# Patient Record
Sex: FEMALE | Race: WHITE | NOT HISPANIC OR LATINO | Employment: FULL TIME | URBAN - METROPOLITAN AREA
[De-identification: names, ages, dates, MRNs, and addresses within clinical notes are randomized per-mention and may not be internally consistent; named-entity substitution may affect disease eponyms.]

---

## 2020-02-24 ENCOUNTER — OFFICE VISIT (OUTPATIENT)
Dept: URGENT CARE | Facility: CLINIC | Age: 30
End: 2020-02-24
Payer: COMMERCIAL

## 2020-02-24 VITALS
SYSTOLIC BLOOD PRESSURE: 133 MMHG | HEIGHT: 65 IN | WEIGHT: 175 LBS | RESPIRATION RATE: 18 BRPM | HEART RATE: 109 BPM | TEMPERATURE: 99.7 F | DIASTOLIC BLOOD PRESSURE: 85 MMHG | OXYGEN SATURATION: 100 % | BODY MASS INDEX: 29.16 KG/M2

## 2020-02-24 DIAGNOSIS — R09.82 PND (POST-NASAL DRIP): ICD-10-CM

## 2020-02-24 DIAGNOSIS — J02.0 STREP PHARYNGITIS: Primary | ICD-10-CM

## 2020-02-24 DIAGNOSIS — J02.9 SORE THROAT: ICD-10-CM

## 2020-02-24 LAB — S PYO AG THROAT QL: POSITIVE

## 2020-02-24 PROCEDURE — 99213 OFFICE O/P EST LOW 20 MIN: CPT | Performed by: NURSE PRACTITIONER

## 2020-02-24 PROCEDURE — 87880 STREP A ASSAY W/OPTIC: CPT | Performed by: NURSE PRACTITIONER

## 2020-02-24 RX ORDER — FLUTICASONE PROPIONATE 50 MCG
1 SPRAY, SUSPENSION (ML) NASAL 2 TIMES DAILY
Qty: 1 BOTTLE | Refills: 0 | Status: SHIPPED | OUTPATIENT
Start: 2020-02-24 | End: 2022-05-16 | Stop reason: ALTCHOICE

## 2020-02-24 RX ORDER — AMOXICILLIN 875 MG/1
875 TABLET, COATED ORAL 2 TIMES DAILY
Qty: 14 TABLET | Refills: 0 | Status: SHIPPED | OUTPATIENT
Start: 2020-02-24 | End: 2020-03-02

## 2020-02-24 RX ORDER — MONTELUKAST SODIUM 10 MG/1
10 TABLET ORAL
COMMUNITY

## 2020-02-24 NOTE — PATIENT INSTRUCTIONS
-Take antibiotics as prescribed  -Take Tylenol/Ibuprofen as needed  -Stay hydrated, drink lots of fluids, soups, and tea with honey   -Change tooth brush 24 hours after beginning antibiotics    -Warm salt water gargles may help with sore throat  -Use cool or warm humidifier     -Your symptoms may last up to 14 days, continue supportive therapy as needed   -Follow up with your PCP if your symptoms become worse or do not improve  -If you have difficulty breathing, can not catch your breath or feel short of breath go directly to the emergency room

## 2020-02-24 NOTE — PROGRESS NOTES
[facility]        NAME: Dennis Aviles is a 34 y o  female  : 1990    MRN: [id number]  DATE: 2020  TIME: 6:41 PM    Assessment and Plan   Strep pharyngitis [J02 0]  1  Strep pharyngitis  amoxicillin (AMOXIL) 875 mg tablet   2  PND (post-nasal drip)  fluticasone (FLONASE) 50 mcg/act nasal spray   3  Sore throat  POCT rapid strepA         Patient Instructions     -Take antibiotics as prescribed  -Take Tylenol/Ibuprofen as needed  -Stay hydrated, drink lots of fluids, soups, and tea with honey   -Change tooth brush 24 hours after beginning antibiotics    -Warm salt water gargles may help with sore throat  -Use cool or warm humidifier     -Your symptoms may last up to 14 days, continue supportive therapy as needed   -Follow up with your PCP if your symptoms become worse or do not improve  -If you have difficulty breathing, can not catch your breath or feel short of breath go directly to the emergency room  Follow up with PCP in 3-5 days  Proceed to  ER if symptoms worsen  Chief Complaint     Chief Complaint   Patient presents with    Sore Throat     sore throat and ear pain since saturday  took advil yesterday, nothing today          History of Present Illness       33 y/o female presents with sore throat and ear pressure  She states the ear pressure has been present for weeks  She has a history of allergies in which she takes singular and Xyzal   Her sore throat began 2 days ago  She denies any N/V, HA, congestion, cough, or difficulty breathing  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  She has not taken any OTC medications or done any supportive measures such as warm salt water gargles  Review of Systems   Review of Systems   Constitutional: Negative for chills, fatigue and fever  HENT: Positive for sore throat  Negative for congestion, ear pain, rhinorrhea, sinus pressure and trouble swallowing      Respiratory: Negative for cough, shortness of breath and wheezing  Cardiovascular: Negative for chest pain and palpitations  Gastrointestinal: Negative for abdominal pain, diarrhea, nausea and vomiting  Skin: Negative for rash  Neurological: Negative for headaches  Current Medications       Current Outpatient Medications:     Levocetirizine Dihydrochloride (XYZAL PO), Take by mouth, Disp: , Rfl:     montelukast (SINGULAIR) 10 mg tablet, Take 10 mg by mouth daily at bedtime, Disp: , Rfl:     amoxicillin (AMOXIL) 875 mg tablet, Take 1 tablet (875 mg total) by mouth 2 (two) times a day for 7 days, Disp: 14 tablet, Rfl: 0    fluticasone (FLONASE) 50 mcg/act nasal spray, 1 spray into each nostril 2 (two) times a day for 5 days, Disp: 1 Bottle, Rfl: 0    Current Allergies     Allergies as of 02/24/2020    (No Known Allergies)            The following portions of the patient's history were reviewed and updated as appropriate: allergies, current medications, past family history, past medical history, past social history, past surgical history and problem list      Past Medical History:   Diagnosis Date    Allergic        History reviewed  No pertinent surgical history  History reviewed  No pertinent family history  Medications have been verified  Objective   /85   Pulse (!) 109   Temp 99 7 °F (37 6 °C)   Resp 18   Ht 5' 5" (1 651 m)   Wt 79 4 kg (175 lb)   LMP 02/11/2020   SpO2 100%   BMI 29 12 kg/m²        Physical Exam     Physical Exam   Constitutional: She is oriented to person, place, and time  She appears well-developed and well-nourished  HENT:   Right Ear: External ear and ear canal normal  A middle ear effusion is present  Left Ear: Tympanic membrane, external ear and ear canal normal    Nose: Rhinorrhea present  No mucosal edema  Mouth/Throat: Posterior oropharyngeal erythema present  No posterior oropharyngeal edema  Tonsils are 1+ on the right  Tonsils are 1+ on the left  No tonsillar exudate  Posterior pharynx is red and inflamed, cobblestone in appearance  Cardiovascular: Regular rhythm and normal heart sounds  Tachycardia present  Pulmonary/Chest: Effort normal and breath sounds normal  No respiratory distress  She has no rales  Musculoskeletal: Normal range of motion  Lymphadenopathy:        Head (right side): No tonsillar adenopathy present  Head (left side): No tonsillar adenopathy present  Neurological: She is alert and oriented to person, place, and time  She has normal strength  GCS eye subscore is 4  GCS verbal subscore is 5  GCS motor subscore is 6  Skin: Skin is warm and dry  No pallor  Psychiatric: She has a normal mood and affect  Her speech is normal and behavior is normal    Nursing note and vitals reviewed

## 2020-02-24 NOTE — LETTER
February 24, 2020     Patient: Keara Estrada   YOB: 1990   Date of Visit: 2/24/2020       To Whom It May Concern: It is my medical opinion that Keara Estrada may return to work on 2/26/2020  If you have any questions or concerns, please don't hesitate to call           Sincerely,        KOFI Garcia

## 2021-04-09 DIAGNOSIS — Z23 ENCOUNTER FOR IMMUNIZATION: ICD-10-CM

## 2022-05-16 ENCOUNTER — OFFICE VISIT (OUTPATIENT)
Dept: URGENT CARE | Facility: CLINIC | Age: 32
End: 2022-05-16
Payer: COMMERCIAL

## 2022-05-16 VITALS
HEIGHT: 65 IN | WEIGHT: 205 LBS | DIASTOLIC BLOOD PRESSURE: 70 MMHG | TEMPERATURE: 100.3 F | SYSTOLIC BLOOD PRESSURE: 110 MMHG | BODY MASS INDEX: 34.16 KG/M2 | RESPIRATION RATE: 20 BRPM | OXYGEN SATURATION: 97 % | HEART RATE: 146 BPM

## 2022-05-16 DIAGNOSIS — J06.9 VIRAL UPPER RESPIRATORY TRACT INFECTION: Primary | ICD-10-CM

## 2022-05-16 PROCEDURE — 99213 OFFICE O/P EST LOW 20 MIN: CPT | Performed by: PHYSICIAN ASSISTANT

## 2022-05-16 RX ORDER — BENZONATATE 200 MG/1
200 CAPSULE ORAL 3 TIMES DAILY PRN
Qty: 20 CAPSULE | Refills: 0 | Status: SHIPPED | OUTPATIENT
Start: 2022-05-16

## 2022-05-16 NOTE — LETTER
May 16, 2022     Patient: Shira Ford   YOB: 1990   Date of Visit: 5/16/2022       To Whom it May Concern:    Shira Ford was seen in my clinic on 5/16/2022  Please excuse from work on 5/16/22 and 5/17/22  If you have any questions or concerns, please don't hesitate to call           Sincerely,          [facility] Provider        CC: No Recipients

## 2022-05-16 NOTE — PROGRESS NOTES
[facility]        NAME: Gracie Butts is a 32 y o  female  : 1990    MRN: [id number]  DATE: May 16, 2022  TIME: 12:30 PM    Assessment and Plan   Viral upper respiratory tract infection [J06 9]  1  Viral upper respiratory tract infection  benzonatate (TESSALON) 200 MG capsule     Patient Instructions   Viral upper respiratory infection  Recommend mucinex D and flonase for postnasal drip and cough  Rest, fluids and supportive care  May benefit from a cool mist humidifier on night stand  Tylenol/ibuprofen as needed for pain/fever    Follow up with PCP in 3-5 days  Proceed to  ER if symptoms worsen  Chief Complaint     Chief Complaint   Patient presents with    Cold Like Symptoms     Pt here ill x 2 weeks  cough  then last night fever  102,  head congestion  Pt used Advil   Cold,   home covid   last weekend  all negative this was 1 week ago  History of Present Illness       Esther Sanders is a 70-year-old female who presents to clinic complaining of persistent cough x2 weeks  She states that it was improving however last night she developed fever, headache, nasal congestion, and chest tightness with deep inhalation  She also notes bilateral ear pressure but denies any ear pain  She states that her fever was 100 3° F  she denies any chills, sore throat, fatigue, myalgias, ear pain, sinus pain or pressure, shortness of breath, nausea, vomiting, diarrhea, loss of taste or smell, recent travel, or exposure to anyone known COVID positive  Review of Systems   Review of Systems   Constitutional: Positive for fever  Negative for chills and fatigue  HENT: Positive for congestion  Negative for ear pain, sinus pressure, sinus pain and sore throat  Respiratory: Positive for cough and chest tightness  Negative for shortness of breath  Gastrointestinal: Negative for diarrhea, nausea and vomiting  Musculoskeletal: Negative for myalgias  Neurological: Positive for headaches   Negative for dizziness and light-headedness  Current Medications       Current Outpatient Medications:     benzonatate (TESSALON) 200 MG capsule, Take 1 capsule (200 mg total) by mouth as needed in the morning and 1 capsule (200 mg total) as needed at noon and 1 capsule (200 mg total) as needed in the evening for cough  , Disp: 20 capsule, Rfl: 0    Levocetirizine Dihydrochloride (XYZAL PO), Take by mouth, Disp: , Rfl:     montelukast (SINGULAIR) 10 mg tablet, Take 10 mg by mouth daily at bedtime, Disp: , Rfl:     Norethin-Eth Estrad-Fe Biphas (LO LOESTRIN FE PO), Take by mouth, Disp: , Rfl:     Current Allergies     Allergies as of 05/16/2022    (No Known Allergies)            The following portions of the patient's history were reviewed and updated as appropriate: allergies, current medications, past family history, past medical history, past social history, past surgical history and problem list      Past Medical History:   Diagnosis Date    Allergic        History reviewed  No pertinent surgical history  Family History   Problem Relation Age of Onset    No Known Problems Mother     No Known Problems Father          Medications have been verified  Objective   /70 (BP Location: Right arm, Patient Position: Sitting, Cuff Size: Standard)   Pulse (!) 146   Temp 100 3 °F (37 9 °C) (Tympanic)   Resp 20   Ht 5' 5" (1 651 m)   Wt 93 kg (205 lb)   SpO2 97%   BMI 34 11 kg/m²   No LMP recorded  Physical Exam     Physical Exam  Vitals and nursing note reviewed  Constitutional:       General: She is not in acute distress  Appearance: Normal appearance  She is not ill-appearing  HENT:      Right Ear: Tympanic membrane, ear canal and external ear normal       Left Ear: Tympanic membrane, ear canal and external ear normal       Nose: Congestion present  Mouth/Throat:      Mouth: Mucous membranes are moist       Pharynx: No oropharyngeal exudate or posterior oropharyngeal erythema  Cardiovascular:      Rate and Rhythm: Normal rate and regular rhythm  Heart sounds: Normal heart sounds  Pulmonary:      Effort: Pulmonary effort is normal       Breath sounds: Normal breath sounds  Lymphadenopathy:      Cervical: No cervical adenopathy  Neurological:      Mental Status: She is alert and oriented to person, place, and time     Psychiatric:         Mood and Affect: Mood normal          Behavior: Behavior normal

## 2022-05-16 NOTE — PATIENT INSTRUCTIONS
Viral upper respiratory infection  Recommend mucinex D and flonase for postnasal drip and cough  Rest, fluids and supportive care  May benefit from a cool mist humidifier on night stand  Tylenol/ibuprofen as needed for pain/fever    Follow up with PCP in 3-5 days  Proceed to  ER if symptoms worsen

## 2022-08-20 NOTE — PROGRESS NOTES
Assessment and Plan:   Ms Chong William is a 35-year-old female with no significant past medical history who presents for an evaluation of joint pains  She is referred by Dr Neva Thompson for a rheumatology consult  Caren Kern presents today for an evaluation of joint pains and stiffness which has primarily affected her hands and ankles since March 2022 along with visual disturbances since January 2022 with both of these complaints arising after her uneventful pregnancy in delivery in December 2021  With the joint pain, stiffness as well as episode of thumb swelling she describes her symptoms subjectively appears concerning for an inflammatory arthritis but I do not appreciate any evidence of synovitis on her examination today  The serologies so far including an DAYNA by immunofluorescence and rheumatoid factor were normal and it is also reassuring to note that while she did have elevated inflammatory markers in June 2022 on the most recent blood work done on 08/19/2022 her C-reactive protein has near normalized  To further evaluate the joint complaints I would like to complete the inflammatory arthritis workup as listed below  Depending on the results I will determine if there may be a specific diagnosis at this time and change in management, but for now her symptoms will be treated conservatively with use of over-the-counter NSAIDs as needed  - I requested she discuss with her primary care physician if the visual disturbances may be an aura for migraine headaches or if she may be presenting with ocular migraines, but I suspect less likely that this has a rheumatological association     - In regards to the thrombocytopenia it is concerning that she has had a significant drop in her platelet count but the etiology for this is unclear and it has not been a chronic issue    I would like to update a CBC today and if the platelet count is still low then she will need an urgent appointment with her primary care physician and Hematology  Plan:  Diagnoses and all orders for this visit:    Diffuse arthralgia  -     Sedimentation rate, automated; Future  -     Sjogren's Antibodies; Future  -     Cyclic citrul peptide antibody, IgG; Future  -     Ferritin; Future  -     HLA-B27 antigen; Future  -     CBC and differential; Future  -     XR hand 3+ vw right; Future  -     XR hand 3+ vw left; Future  -     Anti-neutrophilic cytoplasmic antibody; Future  -     Angiotensin converting enzyme; Future    Thrombocytopenia (HCC)  -     Sedimentation rate, automated; Future  -     Sjogren's Antibodies; Future  -     Cyclic citrul peptide antibody, IgG; Future  -     Ferritin; Future  -     HLA-B27 antigen; Future  -     CBC and differential; Future  -     Anti-neutrophilic cytoplasmic antibody; Future  -     Angiotensin converting enzyme; Future    Need for hepatitis C screening test  -     Chronic Hepatitis Panel; Future    Thyroid disorder screening  -     TSH, 3rd generation; Future    Other orders  -     amoxicillin (AMOXIL) 875 mg tablet; Take 875 mg by mouth every 12 (twelve) hours  -     doxycycline hyclate (VIBRA-TABS) 100 mg tablet; take 1 tablet by mouth twice a day for 14 days      Activities as tolerated  Continue other medications as prescribed by PCP and other specialists  RTC in 3 months  HPI  Ms Alexis Campuzano is a 80-year-old female with no significant past medical history who presents for an evaluation of joint pains  She is referred by Dr Artem Burden for a rheumatology consult  Patient reports she was in her usual state of health up until January 2022 when after the birth of her child in December 2021 she started to experience new symptoms  Firstly she started with vision issues where she had difficulty focusing on faces and letters especially if there was background light  She had an eye exam done which was normal and was advised she may just have photophobia    She reports these symptoms are still ongoing and usually after this occurs she will start to experience a headache  Additionally, she has experienced chronic joint pains affecting her ankles which she was informed may be secondary to flat feet, but in March 2022 she started to experience new joint pains affecting her hands (worse on the left side) mostly throughout her thumbs and at the bases of her thumbs as well as worsening in her bilateral ankles at the lateral malleolus and the outer aspect of her feet  She has experienced chronic joint pains in her left shoulder region for which she follows with a chiropractor  She reports during her pregnancy she had low back pain but currently is not experiencing concerning back symptoms  She does notice a clicking noise and crunchy sensation with certain positions  No nighttime back pain  No significant joint pains in her wrists, elbows, worsening in her shoulders, hips or knees  She mentions in June she had spontaneous onset of swelling affecting her bilateral thumbs starting at the interphalangeal joints and radiating down  This lasted for about a week and then resolved  No other swelling of the joints  She experiences morning stiffness affecting her hands and ankles that takes about 1 to 1-1/2 hours to improve  She takes an occasional Advil as needed which does help her  She reports a few months ago her symptoms spontaneously improved for about a month but then recurred a few weeks ago  She has also experienced episodes of numbness affecting her bilateral arms from the shoulders down to her fingers which is not necessarily position dependent  She was seen by her primary care physician for these complaints and as there were concerns for Lyme disease she was treated with a 3 week course of doxycycline which she completed in mid July  This did not impact her symptoms      She also had testing done through her primary care physician initially in June 2022 which showed an elevated ESR of 42 and C reactive protein of 26  A CBC, CMP, iron, tick antibody profile, rheumatoid factor, vitamin-D, vitamin B12 and DYANA by immunofluorescence were unremarkable  She had follow-up testing done on 08/19/2022 and this showed a significantly low platelet count of 65  A C-reactive protein had improved to 12  A CMP was unremarkable  Her primary care physician has not reached out to her with these results yet  She denies fevers, unintentional weight loss, alopecia, dry mouth (does have dry eyes), inflammatory eye disease, skin rash, psoriasis, photosensitivity, mouth/nose ulcers, swollen glands, pleuritic chest pain, inflammatory bowel disease, blood clots, miscarriages or Raynaud's  She reports a history of osteoarthritis in her maternal grandmother  No recent infections  The following portions of the patient's history were reviewed and updated as appropriate: allergies, current medications, past family history, past medical history, past social history, past surgical history and problem list       Review of Systems  Constitutional: Negative for weight change, fevers, chills, night sweats, fatigue  ENT/Mouth: Negative for hearing changes, ear pain, nasal congestion, sinus pain, hoarseness, sore throat, rhinorrhea, swallowing difficulty  Eyes: Negative for pain, redness, discharge, vision changes  Cardiovascular: Negative for chest pain, SOB, palpitations  Respiratory: Negative for cough, sputum, wheezing, dyspnea  Gastrointestinal: Negative for nausea, vomiting, diarrhea, constipation, pain, heartburn  Genitourinary: Negative for dysuria, urinary frequency, hematuria  Musculoskeletal: As per HPI  Skin: Negative for skin rash, color changes  Neuro: Negative for weakness, loss of consciousness  Positive for numbness and tingling  Psych: Negative for anxiety, depression  Heme/Lymph: Negative for easy bruising, bleeding, lymphadenopathy          Past Medical History:   Diagnosis Date    Allergic History reviewed  No pertinent surgical history  Social History     Socioeconomic History    Marital status: /Civil Union     Spouse name: Not on file    Number of children: Not on file    Years of education: Not on file    Highest education level: Not on file   Occupational History    Not on file   Tobacco Use    Smoking status: Never Smoker    Smokeless tobacco: Never Used   Vaping Use    Vaping Use: Never used   Substance and Sexual Activity    Alcohol use: Never    Drug use: Never    Sexual activity: Not on file   Other Topics Concern    Not on file   Social History Narrative    Not on file     Social Determinants of Health     Financial Resource Strain: Not on file   Food Insecurity: Not on file   Transportation Needs: Not on file   Physical Activity: Not on file   Stress: Not on file   Social Connections: Not on file   Intimate Partner Violence: Not on file   Housing Stability: Not on file       Family History   Problem Relation Age of Onset    No Known Problems Mother     Atrial fibrillation Father     Stroke Maternal Grandmother     Osteoporosis Maternal Grandmother     Diabetes Maternal Grandmother     Stroke Maternal Grandfather     Parkinsonism Maternal Grandfather        No Known Allergies      Current Outpatient Medications:     amoxicillin (AMOXIL) 875 mg tablet, Take 875 mg by mouth every 12 (twelve) hours, Disp: , Rfl:     benzonatate (TESSALON) 200 MG capsule, Take 1 capsule (200 mg total) by mouth as needed in the morning and 1 capsule (200 mg total) as needed at noon and 1 capsule (200 mg total) as needed in the evening for cough  , Disp: 20 capsule, Rfl: 0    doxycycline hyclate (VIBRA-TABS) 100 mg tablet, take 1 tablet by mouth twice a day for 14 days, Disp: , Rfl:     Levocetirizine Dihydrochloride (XYZAL PO), Take by mouth, Disp: , Rfl:     montelukast (SINGULAIR) 10 mg tablet, Take 10 mg by mouth daily at bedtime, Disp: , Rfl:     Norethin-Eth Estrad-Fe Biphas (LO LOESTRIN FE PO), Take by mouth, Disp: , Rfl:       Objective:    Vitals:    08/22/22 1103   BP: 114/77   Pulse: 70   Temp: 98 4 °F (36 9 °C)   Weight: 92 3 kg (203 lb 6 4 oz)       Physical Exam  General: Well appearing, well nourished, in no distress  Oriented x 3, normal mood and affect  Ambulating without difficulty  Skin: Good turgor, no rash, unusual bruising or prominent lesions  Hair: Normal texture and distribution  Nails: Normal color, no deformities  HEENT:  Head: Normocephalic, atraumatic  Eyes: Conjunctiva clear, sclera non-icteric, EOM intact  Extremities: No amputations or deformities, cyanosis, edema  Musculoskeletal:   There is no peripheral joint soft tissue swelling or tenderness noted  No enthesitis or dactylitis  Neurologic: Alert and oriented  No focal neurological deficits appreciated  Psychiatric: Normal mood and affect  GE Rod    Rheumatology

## 2022-08-22 ENCOUNTER — OFFICE VISIT (OUTPATIENT)
Dept: RHEUMATOLOGY | Facility: CLINIC | Age: 32
End: 2022-08-22
Payer: COMMERCIAL

## 2022-08-22 ENCOUNTER — TELEPHONE (OUTPATIENT)
Dept: RHEUMATOLOGY | Facility: CLINIC | Age: 32
End: 2022-08-22

## 2022-08-22 VITALS
SYSTOLIC BLOOD PRESSURE: 114 MMHG | HEART RATE: 70 BPM | DIASTOLIC BLOOD PRESSURE: 77 MMHG | TEMPERATURE: 98.4 F | WEIGHT: 203.4 LBS | BODY MASS INDEX: 33.85 KG/M2

## 2022-08-22 DIAGNOSIS — Z13.29 THYROID DISORDER SCREENING: ICD-10-CM

## 2022-08-22 DIAGNOSIS — M25.50 DIFFUSE ARTHRALGIA: Primary | ICD-10-CM

## 2022-08-22 DIAGNOSIS — Z11.59 NEED FOR HEPATITIS C SCREENING TEST: ICD-10-CM

## 2022-08-22 DIAGNOSIS — D69.6 THROMBOCYTOPENIA (HCC): ICD-10-CM

## 2022-08-22 PROCEDURE — 99244 OFF/OP CNSLTJ NEW/EST MOD 40: CPT | Performed by: INTERNAL MEDICINE

## 2022-08-22 RX ORDER — DOXYCYCLINE HYCLATE 100 MG
TABLET ORAL
COMMUNITY
Start: 2022-06-29

## 2022-08-22 RX ORDER — AMOXICILLIN 875 MG/1
875 TABLET, COATED ORAL EVERY 12 HOURS
COMMUNITY
Start: 2022-05-31

## 2022-08-22 NOTE — LETTER
August 22, 2022     [address] [place] [id number]    Patient: Radha Montelongo   YOB: 1990   Date of Visit: 8/22/2022       Dear Dr Fabi Clemens:    Thank you for referring Radha Daughters to me for evaluation  Below are my notes for this consultation  If you have questions, please do not hesitate to call me  I look forward to following your patient along with you  Sincerely,        Matthew Saavedra MD        CC: No Recipients  Matthew Saavedra MD  8/22/2022 11:59 AM  Sign when Signing Visit  Assessment and Plan:   Ms Tano Welsh is a 26-year-old female with no significant past medical history who presents for an evaluation of joint pains  She is referred by Dr Fabi Clemens for a rheumatology consult  Cristopher Chase presents today for an evaluation of joint pains and stiffness which has primarily affected her hands and ankles since March 2022 along with visual disturbances since January 2022 with both of these complaints arising after her uneventful pregnancy in delivery in December 2021  With the joint pain, stiffness as well as episode of thumb swelling she describes her symptoms subjectively appears concerning for an inflammatory arthritis but I do not appreciate any evidence of synovitis on her examination today  The serologies so far including an DAYNA by immunofluorescence and rheumatoid factor were normal and it is also reassuring to note that while she did have elevated inflammatory markers in June 2022 on the most recent blood work done on 08/19/2022 her C-reactive protein has near normalized  To further evaluate the joint complaints I would like to complete the inflammatory arthritis workup as listed below  Depending on the results I will determine if there may be a specific diagnosis at this time and change in management, but for now her symptoms will be treated conservatively with use of over-the-counter NSAIDs as needed      - In regards to the thrombocytopenia it is concerning that she has had a significant drop in her platelet count but the etiology for this is unclear and it has not been a chronic issue  I would like to update a CBC today and if the platelet count is still low then she will need an urgent appointment with her primary care physician and Hematology  Plan:  Diagnoses and all orders for this visit:    Diffuse arthralgia  -     Sedimentation rate, automated; Future  -     Sjogren's Antibodies; Future  -     Cyclic citrul peptide antibody, IgG; Future  -     Ferritin; Future  -     HLA-B27 antigen; Future  -     CBC and differential; Future  -     XR hand 3+ vw right; Future  -     XR hand 3+ vw left; Future  -     Anti-neutrophilic cytoplasmic antibody; Future  -     Angiotensin converting enzyme; Future    Thrombocytopenia (HCC)  -     Sedimentation rate, automated; Future  -     Sjogren's Antibodies; Future  -     Cyclic citrul peptide antibody, IgG; Future  -     Ferritin; Future  -     HLA-B27 antigen; Future  -     CBC and differential; Future  -     Anti-neutrophilic cytoplasmic antibody; Future  -     Angiotensin converting enzyme; Future    Need for hepatitis C screening test  -     Chronic Hepatitis Panel; Future    Thyroid disorder screening  -     TSH, 3rd generation; Future    Other orders  -     amoxicillin (AMOXIL) 875 mg tablet; Take 875 mg by mouth every 12 (twelve) hours  -     doxycycline hyclate (VIBRA-TABS) 100 mg tablet; take 1 tablet by mouth twice a day for 14 days      Activities as tolerated  Continue other medications as prescribed by PCP and other specialists  RTC in 3 months  HPI  Ms Kristine Freedman is a 77-year-old female with no significant past medical history who presents for an evaluation of joint pains  She is referred by Dr Boubacar Thakur for a rheumatology consult      Patient reports she was in her usual state of health up until January 2022 when after the birth of her child in December 2021 she started to experience new symptoms  Firstly she started with vision issues where she had difficulty focusing on faces and letters especially if there was background light  She had an eye exam done which was normal and was advised she may just have photophobia  She reports these symptoms are still ongoing and usually after this occurs she will start to experience a headache  Additionally, she has experienced chronic joint pains affecting her ankles which she was informed may be secondary to flat feet, but in March 2022 she started to experience new joint pains affecting her hands (worse on the left side) mostly throughout her thumbs and at the bases of her thumbs as well as worsening in her bilateral ankles at the lateral malleolus and the outer aspect of her feet  She has experienced chronic joint pains in her left shoulder region for which she follows with a chiropractor  She reports during her pregnancy she had low back pain but currently is not experiencing concerning back symptoms  She does notice a clicking noise and crunchy sensation with certain positions  No nighttime back pain  No significant joint pains in her wrists, elbows, worsening in her shoulders, hips or knees  She mentions in June she had spontaneous onset of swelling affecting her bilateral thumbs starting at the interphalangeal joints and radiating down  This lasted for about a week and then resolved  No other swelling of the joints  She experiences morning stiffness affecting her hands and ankles that takes about 1 to 1-1/2 hours to improve  She takes an occasional Advil as needed which does help her  She reports a few months ago her symptoms spontaneously improved for about a month but then recurred a few weeks ago  She has also experienced episodes of numbness affecting her bilateral arms from the shoulders down to her fingers which is not necessarily position dependent    She was seen by her primary care physician for these complaints and as there were concerns for Lyme disease she was treated with a 3 week course of doxycycline which she completed in mid July  This did not impact her symptoms  She also had testing done through her primary care physician initially in June 2022 which showed an elevated ESR of 42 and C reactive protein of 26  A CBC, CMP, iron, tick antibody profile, rheumatoid factor, vitamin-D, vitamin B12 and DAYNA by immunofluorescence were unremarkable  She had follow-up testing done on 08/19/2022 and this showed a significantly low platelet count of 65  A C-reactive protein had improved to 12  A CMP was unremarkable  Her primary care physician has not reached out to her with these results yet  She denies fevers, unintentional weight loss, alopecia, dry mouth (does have dry eyes), inflammatory eye disease, skin rash, psoriasis, photosensitivity, mouth/nose ulcers, swollen glands, pleuritic chest pain, inflammatory bowel disease, blood clots, miscarriages or Raynaud's  She reports a history of osteoarthritis in her maternal grandmother  No recent infections  The following portions of the patient's history were reviewed and updated as appropriate: allergies, current medications, past family history, past medical history, past social history, past surgical history and problem list       Review of Systems  Constitutional: Negative for weight change, fevers, chills, night sweats, fatigue  ENT/Mouth: Negative for hearing changes, ear pain, nasal congestion, sinus pain, hoarseness, sore throat, rhinorrhea, swallowing difficulty  Eyes: Negative for pain, redness, discharge, vision changes  Cardiovascular: Negative for chest pain, SOB, palpitations  Respiratory: Negative for cough, sputum, wheezing, dyspnea  Gastrointestinal: Negative for nausea, vomiting, diarrhea, constipation, pain, heartburn  Genitourinary: Negative for dysuria, urinary frequency, hematuria  Musculoskeletal: As per HPI    Skin: Negative for skin rash, color changes  Neuro: Negative for weakness, loss of consciousness  Positive for numbness and tingling  Psych: Negative for anxiety, depression  Heme/Lymph: Negative for easy bruising, bleeding, lymphadenopathy  Past Medical History:   Diagnosis Date    Allergic        History reviewed  No pertinent surgical history  Social History     Socioeconomic History    Marital status: /Civil Union     Spouse name: Not on file    Number of children: Not on file    Years of education: Not on file    Highest education level: Not on file   Occupational History    Not on file   Tobacco Use    Smoking status: Never Smoker    Smokeless tobacco: Never Used   Vaping Use    Vaping Use: Never used   Substance and Sexual Activity    Alcohol use: Never    Drug use: Never    Sexual activity: Not on file   Other Topics Concern    Not on file   Social History Narrative    Not on file     Social Determinants of Health     Financial Resource Strain: Not on file   Food Insecurity: Not on file   Transportation Needs: Not on file   Physical Activity: Not on file   Stress: Not on file   Social Connections: Not on file   Intimate Partner Violence: Not on file   Housing Stability: Not on file       Family History   Problem Relation Age of Onset    No Known Problems Mother     Atrial fibrillation Father     Stroke Maternal Grandmother     Osteoporosis Maternal Grandmother     Diabetes Maternal Grandmother     Stroke Maternal Grandfather     Parkinsonism Maternal Grandfather        No Known Allergies      Current Outpatient Medications:     amoxicillin (AMOXIL) 875 mg tablet, Take 875 mg by mouth every 12 (twelve) hours, Disp: , Rfl:     benzonatate (TESSALON) 200 MG capsule, Take 1 capsule (200 mg total) by mouth as needed in the morning and 1 capsule (200 mg total) as needed at noon and 1 capsule (200 mg total) as needed in the evening for cough  , Disp: 20 capsule, Rfl: 0    doxycycline hyclate (VIBRA-TABS) 100 mg tablet, take 1 tablet by mouth twice a day for 14 days, Disp: , Rfl:     Levocetirizine Dihydrochloride (XYZAL PO), Take by mouth, Disp: , Rfl:     montelukast (SINGULAIR) 10 mg tablet, Take 10 mg by mouth daily at bedtime, Disp: , Rfl:     Norethin-Eth Estrad-Fe Biphas (LO LOESTRIN FE PO), Take by mouth, Disp: , Rfl:       Objective:    Vitals:    08/22/22 1103   BP: 114/77   Pulse: 70   Temp: 98 4 °F (36 9 °C)   Weight: 92 3 kg (203 lb 6 4 oz)       Physical Exam  General: Well appearing, well nourished, in no distress  Oriented x 3, normal mood and affect  Ambulating without difficulty  Skin: Good turgor, no rash, unusual bruising or prominent lesions  Hair: Normal texture and distribution  Nails: Normal color, no deformities  HEENT:  Head: Normocephalic, atraumatic  Eyes: Conjunctiva clear, sclera non-icteric, EOM intact  Extremities: No amputations or deformities, cyanosis, edema  Musculoskeletal:   There is no peripheral joint soft tissue swelling or tenderness noted  No enthesitis or dactylitis  Neurologic: Alert and oriented  No focal neurological deficits appreciated  Psychiatric: Normal mood and affect  [facility]    Rheumatology

## 2022-08-22 NOTE — TELEPHONE ENCOUNTER
Patient will be having a CBC done today at [facility], before the end of day please obtain the results, thank you

## 2022-08-23 ENCOUNTER — TELEPHONE (OUTPATIENT)
Dept: RHEUMATOLOGY | Facility: CLINIC | Age: 32
End: 2022-08-23

## 2022-09-02 LAB
BASOPHILS # BLD AUTO: 0 X10E3/UL (ref 0–0.2)
BASOPHILS NFR BLD AUTO: 0 %
CCP IGA+IGG SERPL IA-ACNC: 5 UNITS (ref 0–19)
ENA SS-A AB SER-ACNC: 0.2 AI (ref 0–0.9)
ENA SS-B AB SER-ACNC: <0.2 AI (ref 0–0.9)
EOSINOPHIL # BLD AUTO: 0.1 X10E3/UL (ref 0–0.4)
EOSINOPHIL NFR BLD AUTO: 1 %
ERYTHROCYTE [DISTWIDTH] IN BLOOD BY AUTOMATED COUNT: 13 % (ref 11.7–15.4)
ERYTHROCYTE [SEDIMENTATION RATE] IN BLOOD BY WESTERGREN METHOD: 42 MM/HR (ref 0–32)
FERRITIN SERPL-MCNC: 55 NG/ML (ref 15–150)
HAV AB SER QL IA: NEGATIVE
HBV CORE AB SERPL QL IA: NEGATIVE
HBV SURFACE AB SER QL: NON REACTIVE
HBV SURFACE AG SERPL QL IA: NEGATIVE
HCT VFR BLD AUTO: 41 % (ref 34–46.6)
HCV AB S/CO SERPL IA: <0.1 S/CO RATIO (ref 0–0.9)
HGB BLD-MCNC: 14 G/DL (ref 11.1–15.9)
HLA-B27 QL NAA+PROBE: NEGATIVE
IMM GRANULOCYTES # BLD: 0 X10E3/UL (ref 0–0.1)
IMM GRANULOCYTES NFR BLD: 0 %
INTERPRETATION: NORMAL
LYMPHOCYTES # BLD AUTO: 2 X10E3/UL (ref 0.7–3.1)
LYMPHOCYTES NFR BLD AUTO: 20 %
MCH RBC QN AUTO: 27.8 PG (ref 26.6–33)
MCHC RBC AUTO-ENTMCNC: 34.1 G/DL (ref 31.5–35.7)
MCV RBC AUTO: 81 FL (ref 79–97)
MONOCYTES # BLD AUTO: 0.5 X10E3/UL (ref 0.1–0.9)
MONOCYTES NFR BLD AUTO: 5 %
NEUTROPHILS # BLD AUTO: 7.5 X10E3/UL (ref 1.4–7)
NEUTROPHILS NFR BLD AUTO: 74 %
PLATELET # BLD AUTO: 313 X10E3/UL (ref 150–450)
RBC # BLD AUTO: 5.04 X10E6/UL (ref 3.77–5.28)
RFX TO HBC IGM: NORMAL
SL AMB INTERPRETATION: NORMAL
TSH SERPL DL<=0.005 MIU/L-ACNC: 1.87 UIU/ML (ref 0.45–4.5)
WBC # BLD AUTO: 10.1 X10E3/UL (ref 3.4–10.8)

## 2023-02-16 ENCOUNTER — OFFICE VISIT (OUTPATIENT)
Dept: RHEUMATOLOGY | Facility: CLINIC | Age: 33
End: 2023-02-16

## 2023-02-16 VITALS
DIASTOLIC BLOOD PRESSURE: 83 MMHG | HEART RATE: 91 BPM | SYSTOLIC BLOOD PRESSURE: 120 MMHG | BODY MASS INDEX: 32.79 KG/M2 | HEIGHT: 65 IN | WEIGHT: 196.8 LBS

## 2023-02-16 DIAGNOSIS — R70.0 ELEVATED SED RATE: ICD-10-CM

## 2023-02-16 DIAGNOSIS — M25.50 DIFFUSE ARTHRALGIA: Primary | ICD-10-CM

## 2023-02-16 DIAGNOSIS — E01.0 THYROMEGALY: ICD-10-CM

## 2023-02-16 DIAGNOSIS — R79.82 ELEVATED C-REACTIVE PROTEIN (CRP): ICD-10-CM

## 2023-02-16 NOTE — PROGRESS NOTES
Assessment and Plan:   Ms Amy Mtz is a 58-year-old female with no significant past medical history who presents for a follow up of joint pains       - Hillary Boone presents today for a follow-up of joint pains which had been affecting her hands and ankles since March 2022 but reports over the past few months her symptoms have spontaneously resolved and currently she is not describing any articular complaints  It is also reassuring to note that her inflammatory arthritis work-up was unrevealing and her inflammatory markers were downtrending  These will be repeated to assess for stability  As she is fairly asymptomatic at this time she would like to continue an annual follow-up to ensure her symptoms do not change  - In regards to the tingling and pain she is experiencing in her left upper back she will continue care with her chiropractor and massage therapy  I requested she let me know if the coolness sensation on the left side of her face persists as I would recommend a neurology evaluation     - I will also order an ultrasound of her thyroid as her clinical examination may be revealing mild thyromegaly  Plan:  Diagnoses and all orders for this visit:    Diffuse arthralgia  -     Sedimentation rate, automated; Future  -     C-reactive protein; Future    Elevated sed rate  -     Sedimentation rate, automated; Future  -     C-reactive protein; Future    Elevated C-reactive protein (CRP)  -     Sedimentation rate, automated; Future  -     C-reactive protein; Future    Thyromegaly  -     US thyroid; Future      Activities as tolerated  Continue other medications as prescribed by PCP and other specialists  RTC in 1 year  HPI     INITIAL VISIT NOTE (8/2022):  Ms Amy Mtz is a 72-year-old female with no significant past medical history who presents for an evaluation of joint pains  She is referred by Dr Shelley Massey for a rheumatology consult      Patient reports she was in her usual state of health up until January 2022 when after the birth of her child in December 2021 she started to experience new symptoms  Firstly she started with vision issues where she had difficulty focusing on faces and letters especially if there was background light  She had an eye exam done which was normal and was advised she may just have photophobia  She reports these symptoms are still ongoing and usually after this occurs she will start to experience a headache  Additionally, she has experienced chronic joint pains affecting her ankles which she was informed may be secondary to flat feet, but in March 2022 she started to experience new joint pains affecting her hands (worse on the left side) mostly throughout her thumbs and at the bases of her thumbs as well as worsening in her bilateral ankles at the lateral malleolus and the outer aspect of her feet  She has experienced chronic joint pains in her left shoulder region for which she follows with a chiropractor  She reports during her pregnancy she had low back pain but currently is not experiencing concerning back symptoms  She does notice a clicking noise and crunchy sensation with certain positions  No nighttime back pain  No significant joint pains in her wrists, elbows, worsening in her shoulders, hips or knees  She mentions in June she had spontaneous onset of swelling affecting her bilateral thumbs starting at the interphalangeal joints and radiating down  This lasted for about a week and then resolved  No other swelling of the joints  She experiences morning stiffness affecting her hands and ankles that takes about 1 to 1-1/2 hours to improve  She takes an occasional Advil as needed which does help her  She reports a few months ago her symptoms spontaneously improved for about a month but then recurred a few weeks ago    She has also experienced episodes of numbness affecting her bilateral arms from the shoulders down to her fingers which is not necessarily position dependent  She was seen by her primary care physician for these complaints and as there were concerns for Lyme disease she was treated with a 3 week course of doxycycline which she completed in mid July  This did not impact her symptoms  She also had testing done through her primary care physician initially in June 2022 which showed an elevated ESR of 42 and C reactive protein of 26  A CBC, CMP, iron, tick antibody profile, rheumatoid factor, vitamin-D, vitamin B12 and DAYNA by immunofluorescence were unremarkable  She had follow-up testing done on 08/19/2022 and this showed a significantly low platelet count of 65  A C-reactive protein had improved to 12  A CMP was unremarkable  Her primary care physician has not reached out to her with these results yet  She denies fevers, unintentional weight loss, alopecia, dry mouth (does have dry eyes), inflammatory eye disease, skin rash, psoriasis, photosensitivity, mouth/nose ulcers, swollen glands, pleuritic chest pain, inflammatory bowel disease, blood clots, miscarriages or Raynaud's  She reports a history of osteoarthritis in her maternal grandmother  No recent infections  2/16/2023:  Patient presents for a follow-up of her joint pains  I reviewed her testing done after the last office visit which showed a slightly elevated ESR of 42 [upper limit of 32]  A chronic hepatitis panel, CBC, anti-CCP antibody, HLA-B27 antigen, Sjogren's antibodies, TSH and ferritin levels were normal   X-rays of the bilateral hands were normal     She reports since the last office visit her joint pains have actually improved and she is no longer experiencing pain in her hands  She thinks this may have been related to carrying her infant son and now that he is walking she does not have to carry him as much  She has been experiencing a tingling sensation in her left upper back and is seeing a chiropractor for massage therapy which does help    Over the past 10 days she has been noticing a cool sensation on the left side of her face  She has been going through a period of stress and is thinking this may be a trigger for her symptoms  No additional joint pains, swelling or stiffness  No other areas of numbness, tingling or focal weakness  The following portions of the patient's history were reviewed and updated as appropriate: allergies, current medications, past family history, past medical history, past social history, past surgical history and problem list       Review of Systems  Constitutional: Negative for weight change, fevers, chills, night sweats, fatigue  ENT/Mouth: Negative for hearing changes, ear pain, nasal congestion, sinus pain, hoarseness, sore throat, rhinorrhea, swallowing difficulty  Eyes: Negative for pain, redness, discharge, vision changes  Cardiovascular: Negative for chest pain, SOB, palpitations  Respiratory: Negative for cough, sputum, wheezing, dyspnea  Gastrointestinal: Negative for nausea, vomiting, diarrhea, constipation, pain, heartburn  Genitourinary: Negative for dysuria, urinary frequency, hematuria  Musculoskeletal: As per HPI  Skin: Negative for skin rash, color changes  Neuro: Negative for weakness, loss of consciousness  Positive for numbness and tingling  Psych: Negative for anxiety, depression  Heme/Lymph: Negative for easy bruising, bleeding, lymphadenopathy  Past Medical History:   Diagnosis Date   • Allergic        History reviewed  No pertinent surgical history        Social History     Socioeconomic History   • Marital status: /Civil Union     Spouse name: Not on file   • Number of children: Not on file   • Years of education: Not on file   • Highest education level: Not on file   Occupational History   • Not on file   Tobacco Use   • Smoking status: Never   • Smokeless tobacco: Never   Vaping Use   • Vaping Use: Never used   Substance and Sexual Activity   • Alcohol use: Never   • Drug use: Never   • Sexual activity: Not on file   Other Topics Concern   • Not on file   Social History Narrative   • Not on file     Social Determinants of Health     Financial Resource Strain: Not on file   Food Insecurity: Not on file   Transportation Needs: Not on file   Physical Activity: Not on file   Stress: Not on file   Social Connections: Not on file   Intimate Partner Violence: Not on file   Housing Stability: Not on file       Family History   Problem Relation Age of Onset   • No Known Problems Mother    • Atrial fibrillation Father    • Stroke Maternal Grandmother    • Osteoporosis Maternal Grandmother    • Diabetes Maternal Grandmother    • Stroke Maternal Grandfather    • Parkinsonism Maternal Grandfather        No Known Allergies      Current Outpatient Medications:   •  montelukast (SINGULAIR) 10 mg tablet, Take 10 mg by mouth daily at bedtime, Disp: , Rfl:       Objective:    Vitals:    02/16/23 1537   BP: 120/83   BP Location: Left arm   Patient Position: Sitting   Cuff Size: Adult   Pulse: 91   Weight: 89 3 kg (196 lb 12 8 oz)   Height: 5' 5" (1 651 m)       Physical Exam  General: Well appearing, well nourished, in no distress  Oriented x 3, normal mood and affect  Ambulating without difficulty  Skin: Good turgor, no rash, unusual bruising or prominent lesions  Hair: Normal texture and distribution  Nails: Normal color, no deformities  HEENT:  Head: Normocephalic, atraumatic  Eyes: Conjunctiva clear, sclera non-icteric, EOM intact  Neck: Mild thyromegaly appreciated but it appears smooth without any nodules  Extremities: No amputations or deformities, cyanosis, edema  Neurologic: Alert and oriented  Psychiatric: Normal mood and affect  GE Person    Rheumatology

## 2023-04-06 ENCOUNTER — HOSPITAL ENCOUNTER (OUTPATIENT)
Dept: RADIOLOGY | Facility: HOSPITAL | Age: 33
Discharge: HOME | End: 2023-04-06

## 2023-04-06 DIAGNOSIS — E01.0 THYROMEGALY: ICD-10-CM

## 2024-02-02 ENCOUNTER — TELEPHONE (OUTPATIENT)
Age: 34
End: 2024-02-02

## 2024-02-02 NOTE — TELEPHONE ENCOUNTER
Patient changed appt because they are pregnant can we please extend blood work until their next appt which is scheduled  for June.

## 2024-06-02 NOTE — PROGRESS NOTES
Assessment and Plan:   Ms. Johnson is a 33-year-old female with no significant past medical history who presents for a follow up of joint pains.      - Diana presents today for a follow-up of recurrent joint pains she is experiencing affecting her hands, knees and ankles with inflammatory symptoms appreciated such as noticing swelling of her hands in the morning as well as an hours duration of morning stiffness.  There is no synovitis noted on her examination today.  She had similar symptoms affecting her hands and ankles in March 2022 which resolved spontaneously and she was asymptomatic since then up until a few weeks ago after delivering her baby girl in 4/24.  Given the nature of her symptoms this does raise concerns for an early inflammatory arthritis but there is insufficient information to clearly diagnose her with such a condition at this time.  I would like to continue monitoring for this and in the meanwhile recommend she start over-the-counter Tylenol and NSAIDs as needed.  I will also wait to review the results of the inflammatory markers which she had drawn last week.  She will be scheduled for a follow-up visit in 2 weeks to review this as well as her response to the pain medications.  Depending on this changes in management will be considered as indicated.      Plan:  Diagnoses and all orders for this visit:    Diffuse arthralgia  -     C-reactive protein; Future  -     Sedimentation rate, automated; Future    Elevated sed rate  -     C-reactive protein; Future  -     Sedimentation rate, automated; Future    Elevated C-reactive protein (CRP)  -     C-reactive protein; Future  -     Sedimentation rate, automated; Future    Other orders  -     Lexapro 10 MG tablet  -     levocetirizine (Xyzal Allergy 24HR) 5 MG tablet      Activities as tolerated.   Continue other medications as prescribed by PCP and other specialists.       RTC in 2 weeks.       HPI     INITIAL VISIT NOTE (8/2022):  Ms. Johnson is a  31-year-old female with no significant past medical history who presents for an evaluation of joint pains.  She is referred by Dr. Calderon for a rheumatology consult.    Patient reports she was in her usual state of health up until January 2022 when after the birth of her child in December 2021 she started to experience new symptoms.  Firstly she started with vision issues where she had difficulty focusing on faces and letters especially if there was background light.  She had an eye exam done which was normal and was advised she may just have photophobia.  She reports these symptoms are still ongoing and usually after this occurs she will start to experience a headache.  Additionally, she has experienced chronic joint pains affecting her ankles which she was informed may be secondary to flat feet, but in March 2022 she started to experience new joint pains affecting her hands (worse on the left side) mostly throughout her thumbs and at the bases of her thumbs as well as worsening in her bilateral ankles at the lateral malleolus and the outer aspect of her feet.  She has experienced chronic joint pains in her left shoulder region for which she follows with a chiropractor.  She reports during her pregnancy she had low back pain but currently is not experiencing concerning back symptoms.  She does notice a clicking noise and crunchy sensation with certain positions.  No nighttime back pain.  No significant joint pains in her wrists, elbows, worsening in her shoulders, hips or knees.  She mentions in June she had spontaneous onset of swelling affecting her bilateral thumbs starting at the interphalangeal joints and radiating down.  This lasted for about a week and then resolved.  No other swelling of the joints.  She experiences morning stiffness affecting her hands and ankles that takes about 1 to 1-1/2 hours to improve.  She takes an occasional Advil as needed which does help her.  She reports a few months ago her  symptoms spontaneously improved for about a month but then recurred a few weeks ago.  She has also experienced episodes of numbness affecting her bilateral arms from the shoulders down to her fingers which is not necessarily position dependent.  She was seen by her primary care physician for these complaints and as there were concerns for Lyme disease she was treated with a 3 week course of doxycycline which she completed in mid July.  This did not impact her symptoms.    She also had testing done through her primary care physician initially in June 2022 which showed an elevated ESR of 42 and C reactive protein of 26.  A CBC, CMP, iron, tick antibody profile, rheumatoid factor, vitamin-D, vitamin B12 and DAYNA by immunofluorescence were unremarkable.  She had follow-up testing done on 08/19/2022 and this showed a significantly low platelet count of 65.  A C-reactive protein had improved to 12.  A CMP was unremarkable.  Her primary care physician has not reached out to her with these results yet.    She denies fevers, unintentional weight loss, alopecia, dry mouth (does have dry eyes), inflammatory eye disease, skin rash, psoriasis, photosensitivity, mouth/nose ulcers, swollen glands, pleuritic chest pain, inflammatory bowel disease, blood clots, miscarriages or Raynaud's.  She reports a history of osteoarthritis in her maternal grandmother.  No recent infections.      2/16/2023:  Patient presents for a follow-up of her joint pains.  I reviewed her testing done after the last office visit which showed a slightly elevated ESR of 42 [upper limit of 32].  A chronic hepatitis panel, CBC, anti-CCP antibody, HLA-B27 antigen, Sjogren's antibodies, TSH and ferritin levels were normal.  X-rays of the bilateral hands were normal.    She reports since the last office visit her joint pains have actually improved and she is no longer experiencing pain in her hands.  She thinks this may have been related to carrying her infant son  and now that he is walking she does not have to carry him as much.  She has been experiencing a tingling sensation in her left upper back and is seeing a chiropractor for massage therapy which does help.  Over the past 10 days she has been noticing a cool sensation on the left side of her face.  She has been going through a period of stress and is thinking this may be a trigger for her symptoms.  No additional joint pains, swelling or stiffness.  No other areas of numbness, tingling or focal weakness.      6/3/2024:  Patient presents for a follow-up visit of her joint pains.  She had labs done last week but I do not have the results yet.    She delivered a baby girl 2 months ago and reports since then she has had a recurrence of joint pains affecting her hands, knees and ankles.  In the morning she feels like her hands are also swollen.  No other joint swelling.  She does experience morning stiffness affecting her hands that can take more than an hour to improve.  She has not tried any over-the-counter pain medications yet.  She is not breast-feeding.  No associated symptoms with the recurrence of joint pains.  She mentions during her pregnancy she was asymptomatic.    The following portions of the patient's history were reviewed and updated as appropriate: allergies, current medications, past family history, past medical history, past social history, past surgical history and problem list.      Review of Systems  Constitutional: Negative for weight change, fevers, chills, night sweats, fatigue.  ENT/Mouth: Negative for hearing changes, ear pain, nasal congestion, sinus pain, hoarseness, sore throat, rhinorrhea, swallowing difficulty.   Eyes: Negative for pain, redness, discharge, vision changes.   Cardiovascular: Negative for chest pain, SOB, palpitations.   Respiratory: Negative for cough, sputum, wheezing, dyspnea.   Gastrointestinal: Negative for nausea, vomiting, diarrhea, constipation, pain,  heartburn.  Genitourinary: Negative for dysuria, urinary frequency, hematuria.   Musculoskeletal: As per HPI.  Skin: Negative for skin rash, color changes.   Neuro: Negative for weakness, loss of consciousness.    Psych: Negative for anxiety, depression.   Heme/Lymph: Negative for easy bruising, bleeding, lymphadenopathy.        Past Medical History:   Diagnosis Date    Allergic        No past surgical history on file.      Social History     Socioeconomic History    Marital status: /Civil Union     Spouse name: Not on file    Number of children: Not on file    Years of education: Not on file    Highest education level: Not on file   Occupational History    Not on file   Tobacco Use    Smoking status: Never    Smokeless tobacco: Never   Vaping Use    Vaping status: Never Used   Substance and Sexual Activity    Alcohol use: Never    Drug use: Never    Sexual activity: Not on file   Other Topics Concern    Not on file   Social History Narrative    Not on file     Social Determinants of Health     Financial Resource Strain: Not on file   Food Insecurity: Not on file   Transportation Needs: Not on file   Physical Activity: Not on file   Stress: Not on file   Social Connections: Not on file   Intimate Partner Violence: Not on file   Housing Stability: Not on file       Family History   Problem Relation Age of Onset    No Known Problems Mother     Atrial fibrillation Father     Stroke Maternal Grandmother     Osteoporosis Maternal Grandmother     Diabetes Maternal Grandmother     Stroke Maternal Grandfather     Parkinsonism Maternal Grandfather        Allergies   Allergen Reactions    Strawberry (Diagnostic) - Food Allergy Hives and Itching       Current Outpatient Medications:     levocetirizine (Xyzal Allergy 24HR) 5 MG tablet, , Disp: , Rfl:     Lexapro 10 MG tablet, , Disp: , Rfl:     montelukast (SINGULAIR) 10 mg tablet, Take 10 mg by mouth daily at bedtime, Disp: , Rfl:       Objective:    Vitals:    06/03/24  "1021   BP: 122/68   Pulse: 86   SpO2: 99%   Weight: 97.5 kg (215 lb)   Height: 5' 5\" (1.651 m)       Physical Exam  General: Well appearing, well nourished, in no distress. Oriented x 3, normal mood and affect.  Ambulating without difficulty.  Skin: Good turgor, no rash, unusual bruising or prominent lesions.  Hair: Normal texture and distribution.  Nails: Normal color, no deformities.  HEENT:  Head: Normocephalic, atraumatic.  Eyes: Conjunctiva clear, sclera non-icteric, EOM intact.  Extremities: No amputations or deformities, cyanosis, edema.  Musculoskeletal:  She reports tenderness to palpation of her right hand PIP joints but there is no soft tissue swelling noted.  The remainder of her joints are unremarkable without soft tissue swelling or tenderness.  Neurologic: Alert and oriented.   Psychiatric: Normal mood and affect.       Arleth White M.D.  Rheumatology  "

## 2024-06-03 ENCOUNTER — OFFICE VISIT (OUTPATIENT)
Dept: RHEUMATOLOGY | Facility: CLINIC | Age: 34
End: 2024-06-03
Payer: COMMERCIAL

## 2024-06-03 VITALS
WEIGHT: 215 LBS | OXYGEN SATURATION: 99 % | SYSTOLIC BLOOD PRESSURE: 122 MMHG | HEIGHT: 65 IN | BODY MASS INDEX: 35.82 KG/M2 | HEART RATE: 86 BPM | DIASTOLIC BLOOD PRESSURE: 68 MMHG

## 2024-06-03 DIAGNOSIS — R70.0 ELEVATED SED RATE: ICD-10-CM

## 2024-06-03 DIAGNOSIS — M25.50 DIFFUSE ARTHRALGIA: Primary | ICD-10-CM

## 2024-06-03 DIAGNOSIS — R79.82 ELEVATED C-REACTIVE PROTEIN (CRP): ICD-10-CM

## 2024-06-03 PROCEDURE — 99214 OFFICE O/P EST MOD 30 MIN: CPT | Performed by: INTERNAL MEDICINE

## 2024-06-03 RX ORDER — LEVOCETIRIZINE DIHYDROCHLORIDE 5 MG/1
TABLET, FILM COATED ORAL
COMMUNITY
Start: 2024-06-02

## 2024-06-03 RX ORDER — ESCITALOPRAM OXALATE 10 MG/1
TABLET, FILM COATED ORAL
COMMUNITY
Start: 2024-04-10

## 2024-06-04 LAB
CRP SERPL-MCNC: 17 MG/L (ref 0–10)
ERYTHROCYTE [SEDIMENTATION RATE] IN BLOOD BY WESTERGREN METHOD: 15 MM/HR (ref 0–32)

## 2024-07-24 ENCOUNTER — TELEPHONE (OUTPATIENT)
Dept: RHEUMATOLOGY | Facility: CLINIC | Age: 34
End: 2024-07-24

## 2024-07-24 ENCOUNTER — TELEMEDICINE (OUTPATIENT)
Dept: RHEUMATOLOGY | Facility: CLINIC | Age: 34
End: 2024-07-24
Payer: COMMERCIAL

## 2024-07-24 DIAGNOSIS — R79.82 ELEVATED C-REACTIVE PROTEIN (CRP): ICD-10-CM

## 2024-07-24 DIAGNOSIS — M25.50 DIFFUSE ARTHRALGIA: Primary | ICD-10-CM

## 2024-07-24 PROCEDURE — 99214 OFFICE O/P EST MOD 30 MIN: CPT | Performed by: INTERNAL MEDICINE

## 2024-07-25 NOTE — PROGRESS NOTES
Virtual Regular Visit  Name: Diana Johnson      : 1990      MRN: [id number]  Encounter Provider: Arleth White MD  Encounter Date: 2024   Encounter department: [facility] [place]    Verification of patient location:    Patient is located at Home in the following state in which I hold an active license [place]    Assessment & Plan   1. Diffuse arthralgia  Assessment & Plan:  Diana presents today for a follow-up of recurrent joint pains she is experiencing affecting her hands, knees and ankles with inflammatory symptoms appreciated such as noticing swelling of her hands in the morning (improved from the last visit) as well as a few hours duration of morning stiffness.  She had similar symptoms affecting her hands and ankles in 2022 which resolved spontaneously and she was asymptomatic since then up until a few weeks ago after delivering her baby girl in .  Given the nature of her symptoms this does raise concerns for an early inflammatory arthritis but there is insufficient information to clearly diagnose her with such a condition at this time.  I would like to continue monitoring for this and in the meanwhile recommend she continue over-the-counter Tylenol and NSAIDs as needed.  I will continue to trend the slightly elevated CRP.  If her symptoms progress in between visits she was advised to contact me.     Orders:  -     C-reactive protein; Future  2. Elevated C-reactive protein (CRP)  -     C-reactive protein; Future        Encounter provider Arleth White MD    The patient was identified by name and date of birth. Diana Johnson was informed that this is a telemedicine visit and that the visit is being conducted through the Epic Embedded platform. She agrees to proceed..  My office door was closed. No one else was in the room.  She acknowledged consent and understanding of privacy and security of the video platform. The patient has agreed to participate and  understands they can discontinue the visit at any time.    Patient is aware this is a billable service.     History of Present Illness     INITIAL VISIT NOTE (8/2022):  Ms. Johnson is a 31-year-old female with no significant past medical history who presents for an evaluation of joint pains.  She is referred by Dr. Calderon for a rheumatology consult.     Patient reports she was in her usual state of health up until January 2022 when after the birth of her child in December 2021 she started to experience new symptoms.  Firstly she started with vision issues where she had difficulty focusing on faces and letters especially if there was background light.  She had an eye exam done which was normal and was advised she may just have photophobia.  She reports these symptoms are still ongoing and usually after this occurs she will start to experience a headache.  Additionally, she has experienced chronic joint pains affecting her ankles which she was informed may be secondary to flat feet, but in March 2022 she started to experience new joint pains affecting her hands (worse on the left side) mostly throughout her thumbs and at the bases of her thumbs as well as worsening in her bilateral ankles at the lateral malleolus and the outer aspect of her feet.  She has experienced chronic joint pains in her left shoulder region for which she follows with a chiropractor.  She reports during her pregnancy she had low back pain but currently is not experiencing concerning back symptoms.  She does notice a clicking noise and crunchy sensation with certain positions.  No nighttime back pain.  No significant joint pains in her wrists, elbows, worsening in her shoulders, hips or knees.  She mentions in June she had spontaneous onset of swelling affecting her bilateral thumbs starting at the interphalangeal joints and radiating down.  This lasted for about a week and then resolved.  No other swelling of the joints.  She experiences  morning stiffness affecting her hands and ankles that takes about 1 to 1-1/2 hours to improve.  She takes an occasional Advil as needed which does help her.  She reports a few months ago her symptoms spontaneously improved for about a month but then recurred a few weeks ago.  She has also experienced episodes of numbness affecting her bilateral arms from the shoulders down to her fingers which is not necessarily position dependent.  She was seen by her primary care physician for these complaints and as there were concerns for Lyme disease she was treated with a 3 week course of doxycycline which she completed in mid July.  This did not impact her symptoms.     She also had testing done through her primary care physician initially in June 2022 which showed an elevated ESR of 42 and C reactive protein of 26.  A CBC, CMP, iron, tick antibody profile, rheumatoid factor, vitamin-D, vitamin B12 and DAYNA by immunofluorescence were unremarkable.  She had follow-up testing done on 08/19/2022 and this showed a significantly low platelet count of 65.  A C-reactive protein had improved to 12.  A CMP was unremarkable.  Her primary care physician has not reached out to her with these results yet.     She denies fevers, unintentional weight loss, alopecia, dry mouth (does have dry eyes), inflammatory eye disease, skin rash, psoriasis, photosensitivity, mouth/nose ulcers, swollen glands, pleuritic chest pain, inflammatory bowel disease, blood clots, miscarriages or Raynaud's.  She reports a history of osteoarthritis in her maternal grandmother.  No recent infections.        2/16/2023:  Patient presents for a follow-up of her joint pains.  I reviewed her testing done after the last office visit which showed a slightly elevated ESR of 42 [upper limit of 32].  A chronic hepatitis panel, CBC, anti-CCP antibody, HLA-B27 antigen, Sjogren's antibodies, TSH and ferritin levels were normal.  X-rays of the bilateral hands were normal.     She  reports since the last office visit her joint pains have actually improved and she is no longer experiencing pain in her hands.  She thinks this may have been related to carrying her infant son and now that he is walking she does not have to carry him as much.  She has been experiencing a tingling sensation in her left upper back and is seeing a chiropractor for massage therapy which does help.  Over the past 10 days she has been noticing a cool sensation on the left side of her face.  She has been going through a period of stress and is thinking this may be a trigger for her symptoms.  No additional joint pains, swelling or stiffness.  No other areas of numbness, tingling or focal weakness.        6/3/2024:  Patient presents for a follow-up visit of her joint pains.  She had labs done last week but I do not have the results yet.     She delivered a baby girl 2 months ago and reports since then she has had a recurrence of joint pains affecting her hands, knees and ankles.  In the morning she feels like her hands are also swollen.  No other joint swelling.  She does experience morning stiffness affecting her hands that can take more than an hour to improve.  She has not tried any over-the-counter pain medications yet.  She is not breast-feeding.  No associated symptoms with the recurrence of joint pains.  She mentions during her pregnancy she was asymptomatic.      7/24/2024:  Patient presents for a follow-up visit of her joint pains.  I reviewed her labs done at the last visit which showed a normal ESR and slightly elevated CRP of 17.    She reports pain still affecting her hands, knees and ankles without swelling but morning stiffness taking a few hours to improve.  Throughout the day her symptoms do resolve.  She is taking acetaminophen as needed which does help her and shortens the duration of morning stiffness if she takes it first thing in the morning.  No new complaints.      Review of Systems  Constitutional:  Negative for weight change, fevers, chills, night sweats, fatigue.  ENT/Mouth: Negative for hearing changes, ear pain, nasal congestion, sinus pain, hoarseness, sore throat, rhinorrhea, swallowing difficulty.   Eyes: Negative for pain, redness, discharge, vision changes.   Cardiovascular: Negative for chest pain, SOB, palpitations.   Respiratory: Negative for cough, sputum, wheezing, dyspnea.   Gastrointestinal: Negative for nausea, vomiting, diarrhea, constipation, pain, heartburn.  Genitourinary: Negative for dysuria, urinary frequency, hematuria.   Musculoskeletal: As per HPI.  Skin: Negative for skin rash, color changes.   Neuro: Negative for weakness, numbness, tingling, loss of consciousness.   Psych: Negative for anxiety, depression.   Heme/Lymph: Negative for easy bruising, bleeding, lymphadenopathy.        Past Medical History   Past Medical History:   Diagnosis Date    Allergic      No past surgical history on file.  Family History   Problem Relation Age of Onset    No Known Problems Mother     Atrial fibrillation Father     Stroke Maternal Grandmother     Osteoporosis Maternal Grandmother     Diabetes Maternal Grandmother     Stroke Maternal Grandfather     Parkinsonism Maternal Grandfather      Current Outpatient Medications on File Prior to Visit   Medication Sig Dispense Refill    levocetirizine (Xyzal Allergy 24HR) 5 MG tablet       Lexapro 10 MG tablet       montelukast (SINGULAIR) 10 mg tablet Take 10 mg by mouth daily at bedtime       No current facility-administered medications on file prior to visit.     Allergies   Allergen Reactions    Strawberry (Diagnostic) - Food Allergy Hives and Itching      Current Outpatient Medications on File Prior to Visit   Medication Sig Dispense Refill    levocetirizine (Xyzal Allergy 24HR) 5 MG tablet       Lexapro 10 MG tablet       montelukast (SINGULAIR) 10 mg tablet Take 10 mg by mouth daily at bedtime       No current facility-administered medications on file  prior to visit.      Social History     Tobacco Use    Smoking status: Never    Smokeless tobacco: Never   Vaping Use    Vaping status: Never Used   Substance and Sexual Activity    Alcohol use: Never    Drug use: Never    Sexual activity: Not on file     Objective     There were no vitals taken for this visit.  Physical Exam  General: Well appearing, well nourished, in no distress. Oriented x 3, normal mood and affect.  Skin: Good turgor, no rash, unusual bruising or prominent lesions.  Hair: Normal texture and distribution.  HEENT:  Head: Normocephalic, atraumatic.  Eyes: Conjunctiva clear, sclera non-icteric, EOM intact.  Nose: No external lesions.  Neck: Supple.  Neurologic: Alert and oriented. No focal neurological deficits appreciated.   Psychiatric: Normal mood and affect.       Visit Time  Total Visit Duration: 21 minutes.

## 2024-07-25 NOTE — ASSESSMENT & PLAN NOTE
Diana presents today for a follow-up of recurrent joint pains she is experiencing affecting her hands, knees and ankles with inflammatory symptoms appreciated such as noticing swelling of her hands in the morning (improved from the last visit) as well as a few hours duration of morning stiffness.  She had similar symptoms affecting her hands and ankles in March 2022 which resolved spontaneously and she was asymptomatic since then up until a few weeks ago after delivering her baby girl in 4/24.  Given the nature of her symptoms this does raise concerns for an early inflammatory arthritis but there is insufficient information to clearly diagnose her with such a condition at this time.  I would like to continue monitoring for this and in the meanwhile recommend she continue over-the-counter Tylenol and NSAIDs as needed.  I will continue to trend the slightly elevated CRP.  If her symptoms progress in between visits she was advised to contact me.

## 2024-12-16 PROBLEM — M06.4 UNDIFFERENTIATED INFLAMMATORY ARTHRITIS (HCC): Status: ACTIVE | Noted: 2024-12-16

## 2024-12-16 PROBLEM — M25.50 DIFFUSE ARTHRALGIA: Status: RESOLVED | Noted: 2024-07-24 | Resolved: 2024-12-16

## 2024-12-16 PROBLEM — R79.82 ELEVATED C-REACTIVE PROTEIN (CRP): Status: RESOLVED | Noted: 2024-07-24 | Resolved: 2024-12-16

## 2024-12-16 PROBLEM — M19.90 UNDIFFERENTIATED INFLAMMATORY ARTHRITIS: Status: ACTIVE | Noted: 2024-12-16

## 2024-12-16 PROBLEM — Z79.899 LONG-TERM USE OF PLAQUENIL: Status: ACTIVE | Noted: 2024-12-16

## 2024-12-18 ENCOUNTER — TELEMEDICINE (OUTPATIENT)
Dept: RHEUMATOLOGY | Facility: CLINIC | Age: 34
End: 2024-12-18
Payer: COMMERCIAL

## 2024-12-18 ENCOUNTER — TELEPHONE (OUTPATIENT)
Dept: RHEUMATOLOGY | Facility: CLINIC | Age: 34
End: 2024-12-18

## 2024-12-18 DIAGNOSIS — Z79.899 LONG-TERM USE OF PLAQUENIL: ICD-10-CM

## 2024-12-18 DIAGNOSIS — M06.4 UNDIFFERENTIATED INFLAMMATORY ARTHRITIS (HCC): Primary | ICD-10-CM

## 2024-12-18 PROCEDURE — 99215 OFFICE O/P EST HI 40 MIN: CPT | Performed by: INTERNAL MEDICINE

## 2024-12-18 RX ORDER — HYDROXYCHLOROQUINE SULFATE 200 MG/1
200 TABLET, FILM COATED ORAL 2 TIMES DAILY WITH MEALS
Qty: 180 TABLET | Refills: 1 | Status: SHIPPED | OUTPATIENT
Start: 2024-12-18 | End: 2025-06-16

## 2024-12-18 RX ORDER — PREDNISONE 10 MG/1
TABLET ORAL
Qty: 30 TABLET | Refills: 0 | Status: SHIPPED | OUTPATIENT
Start: 2024-12-18

## 2024-12-18 NOTE — TELEPHONE ENCOUNTER
Please schedule her for a 5-month follow-up visit in [place].    I also ordered labs if she can have this done in the next few weeks.   No lesions; no rash

## 2024-12-18 NOTE — PROGRESS NOTES
Virtual Regular Visit  Name: Diana Johnson      : 1990      MRN: [id number]  Encounter Provider: Arleth White MD  Encounter Date: 2024   Encounter department: [facility] [place]      Verification of patient location:  Patient is located at Other in the following state in which I hold an active license [place] :  Assessment & Plan  Undifferentiated inflammatory arthritis (HCC)  - Diana presents today for a follow-up of progressively worsening joint pains she is experiencing primarily affecting her upper extremity joints in a symmetric distribution, in association with prolonged morning stiffness.  She had similar symptoms affecting her hands and ankles in 3/2022 which resolved spontaneously but she mentions since delivering her baby girl in 2024 her joint complaints recurred and have not improved.  Given the nature of symptoms this does raise concerns for an inflammatory arthritis which may be undifferentiated at this time or can be considered secondary to seronegative rheumatoid arthritis given the joints involved.  At this time I will start her on hydroxychloroquine 200 mg twice daily.  I reviewed the side effects with her including but not limited to the need for baseline and subsequent annual eye exams.  In the meanwhile she may continue with over-the-counter Tylenol and NSAIDs as needed, and I will also prescribe her a supply of prednisone to use as needed.    Orders:    hydroxychloroquine (PLAQUENIL) 200 mg tablet; Take 1 tablet (200 mg total) by mouth 2 (two) times a day with meals    C-reactive protein; Future    Sedimentation rate, automated; Future    RF Screen w/ Reflex to Titer; Future    Cyclic citrul peptide antibody, IgG; Future    predniSONE 10 mg tablet; Take 1-2 tabs daily as needed.    Long-term use of Plaquenil         Undifferentiated inflammatory arthritis (HCC)         Long-term use of Plaquenil           Patient's rheumatologic disease(s) threaten  long-term function if not appropriately managed.      Encounter provider Arleth White MD    The patient was identified by name and date of birth. Diana Johnson was informed that this is a telemedicine visit and that the visit is being conducted through the Epic Embedded platform. She agrees to proceed..  My office door was closed. No one else was in the room.  She acknowledged consent and understanding of privacy and security of the video platform. The patient has agreed to participate and understands they can discontinue the visit at any time.    Patient is aware this is a billable service.     History of Present Illness     HPI  INITIAL VISIT NOTE (8/2022):  Ms. Johnson is a 31-year-old female with no significant past medical history who presents for an evaluation of joint pains.  She is referred by Dr. Calderon for a rheumatology consult.     Patient reports she was in her usual state of health up until January 2022 when after the birth of her child in December 2021 she started to experience new symptoms.  Firstly she started with vision issues where she had difficulty focusing on faces and letters especially if there was background light.  She had an eye exam done which was normal and was advised she may just have photophobia.  She reports these symptoms are still ongoing and usually after this occurs she will start to experience a headache.  Additionally, she has experienced chronic joint pains affecting her ankles which she was informed may be secondary to flat feet, but in March 2022 she started to experience new joint pains affecting her hands (worse on the left side) mostly throughout her thumbs and at the bases of her thumbs as well as worsening in her bilateral ankles at the lateral malleolus and the outer aspect of her feet.  She has experienced chronic joint pains in her left shoulder region for which she follows with a chiropractor.  She reports during her pregnancy she had low back pain but  currently is not experiencing concerning back symptoms.  She does notice a clicking noise and crunchy sensation with certain positions.  No nighttime back pain.  No significant joint pains in her wrists, elbows, worsening in her shoulders, hips or knees.  She mentions in June she had spontaneous onset of swelling affecting her bilateral thumbs starting at the interphalangeal joints and radiating down.  This lasted for about a week and then resolved.  No other swelling of the joints.  She experiences morning stiffness affecting her hands and ankles that takes about 1 to 1-1/2 hours to improve.  She takes an occasional Advil as needed which does help her.  She reports a few months ago her symptoms spontaneously improved for about a month but then recurred a few weeks ago.  She has also experienced episodes of numbness affecting her bilateral arms from the shoulders down to her fingers which is not necessarily position dependent.  She was seen by her primary care physician for these complaints and as there were concerns for Lyme disease she was treated with a 3 week course of doxycycline which she completed in mid July.  This did not impact her symptoms.     She also had testing done through her primary care physician initially in June 2022 which showed an elevated ESR of 42 and C reactive protein of 26.  A CBC, CMP, iron, tick antibody profile, rheumatoid factor, vitamin-D, vitamin B12 and DAYNA by immunofluorescence were unremarkable.  She had follow-up testing done on 08/19/2022 and this showed a significantly low platelet count of 65.  A C-reactive protein had improved to 12.  A CMP was unremarkable.  Her primary care physician has not reached out to her with these results yet.     She denies fevers, unintentional weight loss, alopecia, dry mouth (does have dry eyes), inflammatory eye disease, skin rash, psoriasis, photosensitivity, mouth/nose ulcers, swollen glands, pleuritic chest pain, inflammatory bowel disease,  blood clots, miscarriages or Raynaud's.  She reports a history of osteoarthritis in her maternal grandmother.  No recent infections.        2/16/2023:  Patient presents for a follow-up of her joint pains.  I reviewed her testing done after the last office visit which showed a slightly elevated ESR of 42 [upper limit of 32].  A chronic hepatitis panel, CBC, anti-CCP antibody, HLA-B27 antigen, Sjogren's antibodies, TSH and ferritin levels were normal.  X-rays of the bilateral hands were normal.     She reports since the last office visit her joint pains have actually improved and she is no longer experiencing pain in her hands.  She thinks this may have been related to carrying her infant son and now that he is walking she does not have to carry him as much.  She has been experiencing a tingling sensation in her left upper back and is seeing a chiropractor for massage therapy which does help.  Over the past 10 days she has been noticing a cool sensation on the left side of her face.  She has been going through a period of stress and is thinking this may be a trigger for her symptoms.  No additional joint pains, swelling or stiffness.  No other areas of numbness, tingling or focal weakness.        6/3/2024:  Patient presents for a follow-up visit of her joint pains.  She had labs done last week but I do not have the results yet.     She delivered a baby girl 2 months ago and reports since then she has had a recurrence of joint pains affecting her hands, knees and ankles.  In the morning she feels like her hands are also swollen.  No other joint swelling.  She does experience morning stiffness affecting her hands that can take more than an hour to improve.  She has not tried any over-the-counter pain medications yet.  She is not breast-feeding.  No associated symptoms with the recurrence of joint pains.  She mentions during her pregnancy she was asymptomatic.        7/24/2024:  Patient presents for a follow-up visit of  her joint pains.  I reviewed her labs done at the last visit which showed a normal ESR and slightly elevated CRP of 17.     She reports pain still affecting her hands, knees and ankles without swelling but morning stiffness taking a few hours to improve.  Throughout the day her symptoms do resolve.  She is taking acetaminophen as needed which does help her and shortens the duration of morning stiffness if she takes it first thing in the morning.  No new complaints.      12/18/2024:  Patient presents for a follow-up of progressively worsening joint pains.  She is now experiencing pain in her hands, wrists, particularly in her elbows and has also noticed it in her lower extremity joints, but states her upper body is most symptomatic.  No joint swelling.  She does experience morning stiffness mostly in her hands, wrists and elbows which can take up to midday to improve, but the elbow symptoms seem to persist to some degree.  She has tried Tylenol and NSAIDs which provide her with mild relief.  No other complaints noted.      Review of Systems  Constitutional: Negative for weight change, fevers, chills, night sweats, fatigue.  ENT/Mouth: Negative for hearing changes, ear pain, nasal congestion, sinus pain, hoarseness, sore throat, rhinorrhea, swallowing difficulty.   Eyes: Negative for pain, redness, discharge, vision changes.   Cardiovascular: Negative for chest pain, SOB, palpitations.   Respiratory: Negative for cough, sputum, wheezing, dyspnea.   Gastrointestinal: Negative for nausea, vomiting, diarrhea, constipation, pain, heartburn.  Genitourinary: Negative for dysuria, urinary frequency, hematuria.   Musculoskeletal: As per HPI.  Skin: Negative for skin rash, color changes.   Neuro: Negative for weakness, numbness, tingling, loss of consciousness.   Psych: Negative for anxiety, depression.   Heme/Lymph: Negative for easy bruising, bleeding, lymphadenopathy.      Objective   There were no vitals taken for this  visit.    Physical Exam  General: Well appearing, well nourished, in no distress. Oriented x 3, normal mood and affect.  Skin: Good turgor, no rash, unusual bruising or prominent lesions.  Hair: Normal texture and distribution.  HEENT:  Head: Normocephalic, atraumatic.  Eyes: Conjunctiva clear, sclera non-icteric, EOM intact.  Nose: No external lesions.  Neck: Supple.  Neurologic: Alert and oriented. No focal neurological deficits appreciated.   Psychiatric: Normal mood and affect.     Visit Time  Total Visit Duration: 21 minutes.

## 2024-12-18 NOTE — ASSESSMENT & PLAN NOTE
- Diana presents today for a follow-up of progressively worsening joint pains she is experiencing primarily affecting her upper extremity joints in a symmetric distribution, in association with prolonged morning stiffness.  She had similar symptoms affecting her hands and ankles in 3/2022 which resolved spontaneously but she mentions since delivering her baby girl in 4/2024 her joint complaints recurred and have not improved.  Given the nature of symptoms this does raise concerns for an inflammatory arthritis which may be undifferentiated at this time or can be considered secondary to seronegative rheumatoid arthritis given the joints involved.  At this time I will start her on hydroxychloroquine 200 mg twice daily.  I reviewed the side effects with her including but not limited to the need for baseline and subsequent annual eye exams.  In the meanwhile she may continue with over-the-counter Tylenol and NSAIDs as needed, and I will also prescribe her a supply of prednisone to use as needed.    Orders:    hydroxychloroquine (PLAQUENIL) 200 mg tablet; Take 1 tablet (200 mg total) by mouth 2 (two) times a day with meals    C-reactive protein; Future    Sedimentation rate, automated; Future    RF Screen w/ Reflex to Titer; Future    Cyclic citrul peptide antibody, IgG; Future    predniSONE 10 mg tablet; Take 1-2 tabs daily as needed.

## 2024-12-18 NOTE — TELEPHONE ENCOUNTER
The patient was called and scheduled for a follow up appointment. She was also asked to complete her labs prior to her next appointment.

## 2025-04-03 ENCOUNTER — OFFICE VISIT (OUTPATIENT)
Dept: RHEUMATOLOGY | Facility: CLINIC | Age: 35
End: 2025-04-03
Payer: COMMERCIAL

## 2025-04-03 VITALS
HEART RATE: 87 BPM | HEIGHT: 65 IN | SYSTOLIC BLOOD PRESSURE: 118 MMHG | OXYGEN SATURATION: 99 % | WEIGHT: 215.4 LBS | BODY MASS INDEX: 35.89 KG/M2 | DIASTOLIC BLOOD PRESSURE: 78 MMHG

## 2025-04-03 DIAGNOSIS — Z79.899 LONG-TERM USE OF PLAQUENIL: ICD-10-CM

## 2025-04-03 DIAGNOSIS — M25.522 BILATERAL ELBOW JOINT PAIN: ICD-10-CM

## 2025-04-03 DIAGNOSIS — M19.90 UNDIFFERENTIATED INFLAMMATORY ARTHRITIS: Primary | ICD-10-CM

## 2025-04-03 DIAGNOSIS — M25.521 BILATERAL ELBOW JOINT PAIN: ICD-10-CM

## 2025-04-03 PROCEDURE — 99214 OFFICE O/P EST MOD 30 MIN: CPT | Performed by: INTERNAL MEDICINE

## 2025-04-03 RX ORDER — MELOXICAM 15 MG/1
15 TABLET ORAL DAILY PRN
Qty: 30 TABLET | Refills: 0 | Status: SHIPPED | OUTPATIENT
Start: 2025-04-03

## 2025-04-03 NOTE — PROGRESS NOTES
Name: Diana Johnson      : 1990      MRN: [id number]  Encounter Provider: Arleth White MD  Encounter Date: 4/3/2025   Encounter department: [facility]  :  Assessment & Plan  Undifferentiated inflammatory arthritis  - Diana presents today for a follow-up of an undifferentiated inflammatory arthritis that was considered in  due to progressively worsening joint pains affecting her upper extremity joints in a symmetric distribution in association with prolonged morning stiffness.  She had similar symptoms affecting her hands and ankles in 3/2022 which resolved spontaneously.  In view of this I started her on hydroxychloroquine at 200 mg twice daily in  and it does seem like she may have responded as the diffuse arthralgias have resolved and she is currently most symptomatic with her bilateral elbows, but states subjectively she has not really noticed an improvement in the joint pains since starting the hydroxychloroquine.  As she has only been on this for 3 months, I recommend a full trial of at least 6 months so she will continue the hydroxychloroquine at 200 mg twice daily.  She is scheduled for an upcoming baseline eye exam.  As over-the-counter NSAIDs are ineffective I will prescribe her meloxicam 15 mg once a day as needed.    - I will also refer her to orthopedics for an evaluation of the bilateral elbow pain to evaluate for nonrheumatic etiologies.  Depending on their opinion I may consider obtaining advanced imaging of the elbow joints.    Orders:    meloxicam (Mobic) 15 mg tablet; Take 1 tablet (15 mg total) by mouth daily as needed for moderate pain    Long-term use of Plaquenil         Bilateral elbow joint pain    Orders:    Ambulatory Referral to Orthopedic Surgery; Future      Patient's rheumatologic disease(s) threaten long-term function if not appropriately managed.      History of Present Illness   HPI    INITIAL VISIT NOTE (2022):  Ms.  Alex is a 31-year-old female with no significant past medical history who presents for an evaluation of joint pains.  She is referred by Dr. Calderon for a rheumatology consult.     Patient reports she was in her usual state of health up until January 2022 when after the birth of her child in December 2021 she started to experience new symptoms.  Firstly she started with vision issues where she had difficulty focusing on faces and letters especially if there was background light.  She had an eye exam done which was normal and was advised she may just have photophobia.  She reports these symptoms are still ongoing and usually after this occurs she will start to experience a headache.  Additionally, she has experienced chronic joint pains affecting her ankles which she was informed may be secondary to flat feet, but in March 2022 she started to experience new joint pains affecting her hands (worse on the left side) mostly throughout her thumbs and at the bases of her thumbs as well as worsening in her bilateral ankles at the lateral malleolus and the outer aspect of her feet.  She has experienced chronic joint pains in her left shoulder region for which she follows with a chiropractor.  She reports during her pregnancy she had low back pain but currently is not experiencing concerning back symptoms.  She does notice a clicking noise and crunchy sensation with certain positions.  No nighttime back pain.  No significant joint pains in her wrists, elbows, worsening in her shoulders, hips or knees.  She mentions in June she had spontaneous onset of swelling affecting her bilateral thumbs starting at the interphalangeal joints and radiating down.  This lasted for about a week and then resolved.  No other swelling of the joints.  She experiences morning stiffness affecting her hands and ankles that takes about 1 to 1-1/2 hours to improve.  She takes an occasional Advil as needed which does help her.  She reports a few  months ago her symptoms spontaneously improved for about a month but then recurred a few weeks ago.  She has also experienced episodes of numbness affecting her bilateral arms from the shoulders down to her fingers which is not necessarily position dependent.  She was seen by her primary care physician for these complaints and as there were concerns for Lyme disease she was treated with a 3 week course of doxycycline which she completed in mid July.  This did not impact her symptoms.     She also had testing done through her primary care physician initially in June 2022 which showed an elevated ESR of 42 and C reactive protein of 26.  A CBC, CMP, iron, tick antibody profile, rheumatoid factor, vitamin-D, vitamin B12 and DAYNA by immunofluorescence were unremarkable.  She had follow-up testing done on 08/19/2022 and this showed a significantly low platelet count of 65.  A C-reactive protein had improved to 12.  A CMP was unremarkable.  Her primary care physician has not reached out to her with these results yet.     She denies fevers, unintentional weight loss, alopecia, dry mouth (does have dry eyes), inflammatory eye disease, skin rash, psoriasis, photosensitivity, mouth/nose ulcers, swollen glands, pleuritic chest pain, inflammatory bowel disease, blood clots, miscarriages or Raynaud's.  She reports a history of osteoarthritis in her maternal grandmother.  No recent infections.        2/16/2023:  Patient presents for a follow-up of her joint pains.  I reviewed her testing done after the last office visit which showed a slightly elevated ESR of 42 [upper limit of 32].  A chronic hepatitis panel, CBC, anti-CCP antibody, HLA-B27 antigen, Sjogren's antibodies, TSH and ferritin levels were normal.  X-rays of the bilateral hands were normal.     She reports since the last office visit her joint pains have actually improved and she is no longer experiencing pain in her hands.  She thinks this may have been related to  carrying her infant son and now that he is walking she does not have to carry him as much.  She has been experiencing a tingling sensation in her left upper back and is seeing a chiropractor for massage therapy which does help.  Over the past 10 days she has been noticing a cool sensation on the left side of her face.  She has been going through a period of stress and is thinking this may be a trigger for her symptoms.  No additional joint pains, swelling or stiffness.  No other areas of numbness, tingling or focal weakness.        6/3/2024:  Patient presents for a follow-up visit of her joint pains.  She had labs done last week but I do not have the results yet.     She delivered a baby girl 2 months ago and reports since then she has had a recurrence of joint pains affecting her hands, knees and ankles.  In the morning she feels like her hands are also swollen.  No other joint swelling.  She does experience morning stiffness affecting her hands that can take more than an hour to improve.  She has not tried any over-the-counter pain medications yet.  She is not breast-feeding.  No associated symptoms with the recurrence of joint pains.  She mentions during her pregnancy she was asymptomatic.        7/24/2024:  Patient presents for a follow-up visit of her joint pains.  I reviewed her labs done at the last visit which showed a normal ESR and slightly elevated CRP of 17.     She reports pain still affecting her hands, knees and ankles without swelling but morning stiffness taking a few hours to improve.  Throughout the day her symptoms do resolve.  She is taking acetaminophen as needed which does help her and shortens the duration of morning stiffness if she takes it first thing in the morning.  No new complaints.        12/18/2024:  Patient presents for a follow-up of progressively worsening joint pains.  She is now experiencing pain in her hands, wrists, particularly in her elbows and has also noticed it in her  lower extremity joints, but states her upper body is most symptomatic.  No joint swelling.  She does experience morning stiffness mostly in her hands, wrists and elbows which can take up to midday to improve, but the elbow symptoms seem to persist to some degree.  She has tried Tylenol and NSAIDs which provide her with mild relief.  No other complaints noted.      4/3/2025:  Patient presents for a follow-up of an undifferentiated inflammatory arthritis.  She is currently on hydroxychloroquine 200 mg twice daily that was started at the end of December 2024.  I reviewed her labs done after the last visit which showed a normal ESR and negative rheumatoid factor and anti-CCP antibody.  The C-reactive protein was slightly elevated at 13.    She mentions since the last visit she has not really noticed any improvement in the hydroxychloroquine, but is also saying that she is not really experiencing any concerning joint pains except for in her elbows.  She reports any movement that requires use of her arms triggers the elbow pain to occur.  If she sleeps with her arm bent the elbow pain also wakes her up from sleep at night.  No joint swelling.  She has been taking over-the-counter Advil as needed but this does not really help.  She did try the prednisone at 10 mg once daily as needed and reports this would give her temporary relief and take the edge off her symptoms but not eliminate the pain.  No new complaints noted.          Review of Systems  Constitutional: Negative for weight change, fevers, chills, night sweats, fatigue.  ENT/Mouth: Negative for hearing changes, ear pain, nasal congestion, sinus pain, hoarseness, sore throat, rhinorrhea, swallowing difficulty.   Eyes: Negative for pain, redness, discharge, vision changes.   Cardiovascular: Negative for chest pain, SOB, palpitations.   Respiratory: Negative for cough, sputum, wheezing, dyspnea.   Gastrointestinal: Negative for nausea, vomiting, diarrhea,  constipation, pain, heartburn.  Genitourinary: Negative for dysuria, urinary frequency, hematuria.   Musculoskeletal: As per HPI.  Skin: Negative for skin rash, color changes.   Neuro: Negative for weakness, numbness, tingling, loss of consciousness.   Psych: Negative for anxiety, depression.   Heme/Lymph: Negative for easy bruising, bleeding, lymphadenopathy.      Past Medical History   Past Medical History:   Diagnosis Date    Allergic      History reviewed. No pertinent surgical history.  Family History   Problem Relation Age of Onset    No Known Problems Mother     Atrial fibrillation Father     Stroke Maternal Grandmother     Osteoporosis Maternal Grandmother     Diabetes Maternal Grandmother     Stroke Maternal Grandfather     Parkinsonism Maternal Grandfather       reports that she has never smoked. She has never used smokeless tobacco. She reports that she does not drink alcohol and does not use drugs.  Current Outpatient Medications   Medication Instructions    hydroxychloroquine (PLAQUENIL) 200 mg, Oral, 2 times daily with meals    levocetirizine (Xyzal Allergy 24HR) 5 MG tablet     Lexapro 10 MG tablet     meloxicam (MOBIC) 15 mg, Oral, Daily PRN    montelukast (SINGULAIR) 10 mg, Daily at bedtime    predniSONE 10 mg tablet Take 1-2 tabs daily as needed.   No Active Allergies   Current Outpatient Medications on File Prior to Visit   Medication Sig Dispense Refill    hydroxychloroquine (PLAQUENIL) 200 mg tablet Take 1 tablet (200 mg total) by mouth 2 (two) times a day with meals 180 tablet 1    Lexapro 10 MG tablet       montelukast (SINGULAIR) 10 mg tablet Take 10 mg by mouth daily at bedtime      predniSONE 10 mg tablet Take 1-2 tabs daily as needed. 30 tablet 0    levocetirizine (Xyzal Allergy 24HR) 5 MG tablet        No current facility-administered medications on file prior to visit.      Social History     Tobacco Use    Smoking status: Never    Smokeless tobacco: Never   Vaping Use    Vaping status:  "Never Used   Substance and Sexual Activity    Alcohol use: Never    Drug use: Never    Sexual activity: Not on file        Objective   /78 (BP Location: Left arm, Patient Position: Sitting, Cuff Size: Adult)   Pulse 87   Ht 5' 5\" (1.651 m)   Wt 97.7 kg (215 lb 6.4 oz)   SpO2 99%   BMI 35.84 kg/m²      Physical Exam  General: Well appearing, well nourished, in no distress. Oriented x 3, normal mood and affect.  Ambulating without difficulty.  Skin: Good turgor, no rash, unusual bruising or prominent lesions.  Hair: Normal texture and distribution.  Nails: Normal color, no deformities.  HEENT:  Head: Normocephalic, atraumatic.  Eyes: Conjunctiva clear, sclera non-icteric, EOM intact.  Nose: No external lesions.  Neck: Supple.  Extremities: No amputations or deformities, cyanosis, edema.  Musculoskeletal:   There is no peripheral joint soft tissue swelling or tenderness noted, including at the elbows.  Neurologic: Alert and oriented. No focal neurological deficits appreciated.   Psychiatric: Normal mood and affect.       "

## 2025-04-03 NOTE — ASSESSMENT & PLAN NOTE
- Diana presents today for a follow-up of an undifferentiated inflammatory arthritis that was considered in 12/24 due to progressively worsening joint pains affecting her upper extremity joints in a symmetric distribution in association with prolonged morning stiffness.  She had similar symptoms affecting her hands and ankles in 3/2022 which resolved spontaneously.  In view of this I started her on hydroxychloroquine at 200 mg twice daily in 12/24 and it does seem like she may have responded as the diffuse arthralgias have resolved and she is currently most symptomatic with her bilateral elbows, but states subjectively she has not really noticed an improvement in the joint pains since starting the hydroxychloroquine.  As she has only been on this for 3 months, I recommend a full trial of at least 6 months so she will continue the hydroxychloroquine at 200 mg twice daily.  She is scheduled for an upcoming baseline eye exam.  As over-the-counter NSAIDs are ineffective I will prescribe her meloxicam 15 mg once a day as needed.    - I will also refer her to orthopedics for an evaluation of the bilateral elbow pain to evaluate for nonrheumatic etiologies.  Depending on their opinion I may consider obtaining advanced imaging of the elbow joints.    Orders:    meloxicam (Mobic) 15 mg tablet; Take 1 tablet (15 mg total) by mouth daily as needed for moderate pain

## 2025-06-07 DIAGNOSIS — M19.90 UNDIFFERENTIATED INFLAMMATORY ARTHRITIS: ICD-10-CM

## 2025-06-09 RX ORDER — HYDROXYCHLOROQUINE SULFATE 200 MG/1
200 TABLET, FILM COATED ORAL 2 TIMES DAILY
Qty: 180 TABLET | Refills: 0 | Status: SHIPPED | OUTPATIENT
Start: 2025-06-09 | End: 2026-06-09

## 2025-06-26 ENCOUNTER — OFFICE VISIT (OUTPATIENT)
Dept: RHEUMATOLOGY | Facility: CLINIC | Age: 35
End: 2025-06-26
Payer: COMMERCIAL

## 2025-06-26 VITALS
DIASTOLIC BLOOD PRESSURE: 78 MMHG | BODY MASS INDEX: 36.19 KG/M2 | SYSTOLIC BLOOD PRESSURE: 120 MMHG | HEIGHT: 65 IN | RESPIRATION RATE: 16 BRPM | WEIGHT: 217.2 LBS | HEART RATE: 89 BPM | OXYGEN SATURATION: 98 %

## 2025-06-26 DIAGNOSIS — M19.90 UNDIFFERENTIATED INFLAMMATORY ARTHRITIS: Primary | ICD-10-CM

## 2025-06-26 DIAGNOSIS — Z79.899 LONG-TERM USE OF PLAQUENIL: ICD-10-CM

## 2025-06-26 PROCEDURE — 99214 OFFICE O/P EST MOD 30 MIN: CPT | Performed by: INTERNAL MEDICINE

## 2025-06-26 RX ORDER — HYDROXYCHLOROQUINE SULFATE 200 MG/1
400 TABLET, FILM COATED ORAL
Qty: 180 TABLET | Refills: 1 | Status: SHIPPED | OUTPATIENT
Start: 2025-06-26 | End: 2025-12-23

## 2025-06-26 NOTE — PROGRESS NOTES
Name: Diana Johnson      : 1990      MRN: [id number]  Encounter Provider: Arleth White MD  Encounter Date: 2025   Encounter department: [facility]  :  Assessment & Plan  Undifferentiated inflammatory arthritis  - Diana presents today for a follow-up of an undifferentiated inflammatory arthritis that was considered in  due to progressively worsening joint pains affecting her upper extremity joints in a symmetric distribution in association with prolonged morning stiffness.  She had similar symptoms affecting her hands and ankles in 3/2022 which resolved spontaneously.  In view of this I started her on hydroxychloroquine at 200 mg twice daily in  and it does seem like she may have responded as the diffuse arthralgias have nearly resolved and the morning stiffness has improved.  In view of this she will maintain the hydroxychloroquine but change the dose to 400 mg once daily.  She is scheduled for her baseline eye exam in .  If needed she will take short courses of prednisone or meloxicam.    Orders:    C-reactive protein; Future    Sedimentation rate, automated; Future    hydroxychloroquine (PLAQUENIL) 200 mg tablet; Take 2 tablets (400 mg total) by mouth daily with breakfast    Long-term use of Plaquenil           Patient's rheumatologic disease(s) threaten long-term function if not appropriately managed.      History of Present Illness   HPI    INITIAL VISIT NOTE (2022):  Ms. Johnson is a 31-year-old female with no significant past medical history who presents for an evaluation of joint pains.  She is referred by Dr. Calderon for a rheumatology consult.     Patient reports she was in her usual state of health up until 2022 when after the birth of her child in 2021 she started to experience new symptoms.  Firstly she started with vision issues where she had difficulty focusing on faces and letters especially if there was background  light.  She had an eye exam done which was normal and was advised she may just have photophobia.  She reports these symptoms are still ongoing and usually after this occurs she will start to experience a headache.  Additionally, she has experienced chronic joint pains affecting her ankles which she was informed may be secondary to flat feet, but in March 2022 she started to experience new joint pains affecting her hands (worse on the left side) mostly throughout her thumbs and at the bases of her thumbs as well as worsening in her bilateral ankles at the lateral malleolus and the outer aspect of her feet.  She has experienced chronic joint pains in her left shoulder region for which she follows with a chiropractor.  She reports during her pregnancy she had low back pain but currently is not experiencing concerning back symptoms.  She does notice a clicking noise and crunchy sensation with certain positions.  No nighttime back pain.  No significant joint pains in her wrists, elbows, worsening in her shoulders, hips or knees.  She mentions in June she had spontaneous onset of swelling affecting her bilateral thumbs starting at the interphalangeal joints and radiating down.  This lasted for about a week and then resolved.  No other swelling of the joints.  She experiences morning stiffness affecting her hands and ankles that takes about 1 to 1-1/2 hours to improve.  She takes an occasional Advil as needed which does help her.  She reports a few months ago her symptoms spontaneously improved for about a month but then recurred a few weeks ago.  She has also experienced episodes of numbness affecting her bilateral arms from the shoulders down to her fingers which is not necessarily position dependent.  She was seen by her primary care physician for these complaints and as there were concerns for Lyme disease she was treated with a 3 week course of doxycycline which she completed in mid July.  This did not impact her  symptoms.     She also had testing done through her primary care physician initially in June 2022 which showed an elevated ESR of 42 and C reactive protein of 26.  A CBC, CMP, iron, tick antibody profile, rheumatoid factor, vitamin-D, vitamin B12 and DAYNA by immunofluorescence were unremarkable.  She had follow-up testing done on 08/19/2022 and this showed a significantly low platelet count of 65.  A C-reactive protein had improved to 12.  A CMP was unremarkable.  Her primary care physician has not reached out to her with these results yet.     She denies fevers, unintentional weight loss, alopecia, dry mouth (does have dry eyes), inflammatory eye disease, skin rash, psoriasis, photosensitivity, mouth/nose ulcers, swollen glands, pleuritic chest pain, inflammatory bowel disease, blood clots, miscarriages or Raynaud's.  She reports a history of osteoarthritis in her maternal grandmother.  No recent infections.        2/16/2023:  Patient presents for a follow-up of her joint pains.  I reviewed her testing done after the last office visit which showed a slightly elevated ESR of 42 [upper limit of 32].  A chronic hepatitis panel, CBC, anti-CCP antibody, HLA-B27 antigen, Sjogren's antibodies, TSH and ferritin levels were normal.  X-rays of the bilateral hands were normal.     She reports since the last office visit her joint pains have actually improved and she is no longer experiencing pain in her hands.  She thinks this may have been related to carrying her infant son and now that he is walking she does not have to carry him as much.  She has been experiencing a tingling sensation in her left upper back and is seeing a chiropractor for massage therapy which does help.  Over the past 10 days she has been noticing a cool sensation on the left side of her face.  She has been going through a period of stress and is thinking this may be a trigger for her symptoms.  No additional joint pains, swelling or stiffness.  No other  areas of numbness, tingling or focal weakness.        6/3/2024:  Patient presents for a follow-up visit of her joint pains.  She had labs done last week but I do not have the results yet.     She delivered a baby girl 2 months ago and reports since then she has had a recurrence of joint pains affecting her hands, knees and ankles.  In the morning she feels like her hands are also swollen.  No other joint swelling.  She does experience morning stiffness affecting her hands that can take more than an hour to improve.  She has not tried any over-the-counter pain medications yet.  She is not breast-feeding.  No associated symptoms with the recurrence of joint pains.  She mentions during her pregnancy she was asymptomatic.        7/24/2024:  Patient presents for a follow-up visit of her joint pains.  I reviewed her labs done at the last visit which showed a normal ESR and slightly elevated CRP of 17.     She reports pain still affecting her hands, knees and ankles without swelling but morning stiffness taking a few hours to improve.  Throughout the day her symptoms do resolve.  She is taking acetaminophen as needed which does help her and shortens the duration of morning stiffness if she takes it first thing in the morning.  No new complaints.        12/18/2024:  Patient presents for a follow-up of progressively worsening joint pains.  She is now experiencing pain in her hands, wrists, particularly in her elbows and has also noticed it in her lower extremity joints, but states her upper body is most symptomatic.  No joint swelling.  She does experience morning stiffness mostly in her hands, wrists and elbows which can take up to midday to improve, but the elbow symptoms seem to persist to some degree.  She has tried Tylenol and NSAIDs which provide her with mild relief.  No other complaints noted.      4/3/2025:  Patient presents for a follow-up of an undifferentiated inflammatory arthritis.  She is currently on  hydroxychloroquine 200 mg twice daily that was started at the end of December 2024.  I reviewed her labs done after the last visit which showed a normal ESR and negative rheumatoid factor and anti-CCP antibody.  The C-reactive protein was slightly elevated at 13.    She mentions since the last visit she has not really noticed any improvement in the hydroxychloroquine, but is also saying that she is not really experiencing any concerning joint pains except for in her elbows.  She reports any movement that requires use of her arms triggers the elbow pain to occur.  If she sleeps with her arm bent the elbow pain also wakes her up from sleep at night.  No joint swelling.  She has been taking over-the-counter Advil as needed but this does not really help.  She did try the prednisone at 10 mg once daily as needed and reports this would give her temporary relief and take the edge off her symptoms but not eliminate the pain.  No new complaints noted.      6/26/2025:  Patient presents for a follow-up of an undifferentiated inflammatory arthritis.  She is currently on hydroxychloroquine 200 mg twice daily that was started at the end of December 2024.     She reports since our last visit she has been feeling better.  The elbow pain that was previously very prominent has nearly resolved and only occurs sporadically.  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  She is not reporting any consistent or concerning joint pains elsewhere.  In the morning she does notice swelling affecting her hands and also has bilateral ankle puffiness which is chronic.  She still experiences diffuse morning stiffness that can take a few hours to improve, but states this is better than feeling it all day.  She is able to work through the symptoms.  Recently she has not taken any prednisone or meloxicam, but states they are both  helpful when needed.    She is scheduled for her baseline hydroxychloroquine eye exam in September.      Review of Systems  Constitutional: Negative for weight change, fevers, chills, night sweats, fatigue.  ENT/Mouth: Negative for hearing changes, ear pain, nasal congestion, sinus pain, hoarseness, sore throat, rhinorrhea, swallowing difficulty.   Eyes: Negative for pain, redness, discharge, vision changes.   Cardiovascular: Negative for chest pain, SOB, palpitations.   Respiratory: Negative for cough, sputum, wheezing, dyspnea.   Gastrointestinal: Negative for nausea, vomiting, diarrhea, constipation, pain, heartburn.  Genitourinary: Negative for dysuria, urinary frequency, hematuria.   Musculoskeletal: As per HPI.  Skin: Negative for skin rash, color changes.   Neuro: Negative for weakness, numbness, tingling, loss of consciousness.   Psych: Negative for anxiety, depression.   Heme/Lymph: Negative for easy bruising, bleeding, lymphadenopathy.      Past Medical History   Past Medical History:   Diagnosis Date    Allergic      No past surgical history on file.  Family History   Problem Relation Name Age of Onset    No Known Problems Mother      Atrial fibrillation Father      Stroke Maternal Grandmother      Osteoporosis Maternal Grandmother      Diabetes Maternal Grandmother      Stroke Maternal Grandfather      Parkinsonism Maternal Grandfather        reports that she has never smoked. She has never used smokeless tobacco. She reports that she does not drink alcohol and does not use drugs.  Current Outpatient Medications   Medication Instructions    hydroxychloroquine (PLAQUENIL) 400 mg, Oral, Daily with breakfast    levocetirizine (Xyzal Allergy 24HR) 5 MG tablet     Lexapro 10 MG tablet     meloxicam (MOBIC) 15 mg, Oral, Daily PRN    montelukast (SINGULAIR) 10 mg, Daily at bedtime    predniSONE 10 mg tablet Take 1-2 tabs daily as needed.   No Known Allergies   Current Outpatient Medications on File Prior to  "Visit   Medication Sig Dispense Refill    Lexapro 10 MG tablet       meloxicam (Mobic) 15 mg tablet Take 1 tablet (15 mg total) by mouth daily as needed for moderate pain 30 tablet 0    montelukast (SINGULAIR) 10 mg tablet Take 10 mg by mouth daily at bedtime      predniSONE 10 mg tablet Take 1-2 tabs daily as needed. 30 tablet 0    [DISCONTINUED] hydroxychloroquine (PLAQUENIL) 200 mg tablet Take 1 tablet (200 mg total) by mouth in the morning and 1 tablet (200 mg total) before bedtime. 180 tablet 0    levocetirizine (Xyzal Allergy 24HR) 5 MG tablet        No current facility-administered medications on file prior to visit.      Social History     Tobacco Use    Smoking status: Never    Smokeless tobacco: Never   Vaping Use    Vaping status: Never Used   Substance and Sexual Activity    Alcohol use: Never    Drug use: Never        Objective   /78 (BP Location: Left arm, Patient Position: Sitting, Cuff Size: Adult)   Pulse 89   Resp 16   Ht 5' 5\" (1.651 m)   Wt 98.5 kg (217 lb 3.2 oz)   SpO2 98%   BMI 36.14 kg/m²      Physical Exam  General: Well appearing, well nourished, in no distress. Oriented x 3, normal mood and affect.  Ambulating without difficulty.  Skin: Good turgor, no rash, unusual bruising or prominent lesions.  Hair: Normal texture and distribution.  Nails: Normal color, no deformities.  HEENT:  Head: Normocephalic, atraumatic.  Eyes: Conjunctiva clear, sclera non-icteric, EOM intact.  Nose: No external lesions.  Neck: Supple.  Extremities: No amputations or deformities, cyanosis, edema.  Neurologic: Alert and oriented. No focal neurological deficits appreciated.   Psychiatric: Normal mood and affect.       "

## 2025-06-26 NOTE — ASSESSMENT & PLAN NOTE
- Diana presents today for a follow-up of an undifferentiated inflammatory arthritis that was considered in 12/24 due to progressively worsening joint pains affecting her upper extremity joints in a symmetric distribution in association with prolonged morning stiffness.  She had similar symptoms affecting her hands and ankles in 3/2022 which resolved spontaneously.  In view of this I started her on hydroxychloroquine at 200 mg twice daily in 12/24 and it does seem like she may have responded as the diffuse arthralgias have nearly resolved and the morning stiffness has improved.  In view of this she will maintain the hydroxychloroquine but change the dose to 400 mg once daily.  She is scheduled for her baseline eye exam in 9/25.  If needed she will take short courses of prednisone or meloxicam.    Orders:    C-reactive protein; Future    Sedimentation rate, automated; Future    hydroxychloroquine (PLAQUENIL) 200 mg tablet; Take 2 tablets (400 mg total) by mouth daily with breakfast